# Patient Record
Sex: FEMALE | Race: WHITE | NOT HISPANIC OR LATINO | Employment: OTHER | URBAN - METROPOLITAN AREA
[De-identification: names, ages, dates, MRNs, and addresses within clinical notes are randomized per-mention and may not be internally consistent; named-entity substitution may affect disease eponyms.]

---

## 2017-04-21 RX ORDER — ESTRADIOL 1 MG/1
TABLET ORAL
Qty: 30 TABLET | Refills: 3 | Status: SHIPPED | OUTPATIENT
Start: 2017-04-21 | End: 2017-08-23 | Stop reason: SDUPTHER

## 2017-08-23 RX ORDER — ESTRADIOL 1 MG/1
TABLET ORAL
Qty: 30 TABLET | Refills: 2 | Status: SHIPPED | OUTPATIENT
Start: 2017-08-23 | End: 2017-11-20 | Stop reason: SDUPTHER

## 2017-11-20 RX ORDER — ESTRADIOL 1 MG/1
TABLET ORAL
Qty: 30 TABLET | Refills: 2 | OUTPATIENT
Start: 2017-11-20

## 2017-11-20 RX ORDER — ESTRADIOL 1 MG/1
TABLET ORAL
Qty: 90 TABLET | Refills: 1 | Status: SHIPPED | OUTPATIENT
Start: 2017-11-20 | End: 2018-01-08

## 2018-01-08 ENCOUNTER — OFFICE VISIT (OUTPATIENT)
Dept: OBSTETRICS AND GYNECOLOGY | Facility: CLINIC | Age: 53
End: 2018-01-08

## 2018-01-08 VITALS
WEIGHT: 126 LBS | BODY MASS INDEX: 22.32 KG/M2 | DIASTOLIC BLOOD PRESSURE: 80 MMHG | HEIGHT: 63 IN | SYSTOLIC BLOOD PRESSURE: 128 MMHG

## 2018-01-08 DIAGNOSIS — Z12.4 PAP SMEAR FOR CERVICAL CANCER SCREENING: ICD-10-CM

## 2018-01-08 DIAGNOSIS — Z13.89 SCREENING FOR BLOOD OR PROTEIN IN URINE: ICD-10-CM

## 2018-01-08 DIAGNOSIS — Z12.31 SCREENING MAMMOGRAM, ENCOUNTER FOR: ICD-10-CM

## 2018-01-08 DIAGNOSIS — Z01.419 WELL FEMALE EXAM WITH ROUTINE GYNECOLOGICAL EXAM: Primary | ICD-10-CM

## 2018-01-08 DIAGNOSIS — B37.31 VULVOVAGINAL CANDIDIASIS: ICD-10-CM

## 2018-01-08 DIAGNOSIS — Z93.2 ILEOSTOMY IN PLACE (HCC): ICD-10-CM

## 2018-01-08 DIAGNOSIS — K50.80 CROHN'S DISEASE OF BOTH SMALL AND LARGE INTESTINE WITHOUT COMPLICATION (HCC): ICD-10-CM

## 2018-01-08 LAB
BILIRUB BLD-MCNC: NEGATIVE MG/DL
CLARITY, POC: CLEAR
COLOR UR: YELLOW
GLUCOSE UR STRIP-MCNC: NEGATIVE MG/DL
KETONES UR QL: NEGATIVE
LEUKOCYTE EST, POC: NEGATIVE
NITRITE UR-MCNC: NEGATIVE MG/ML
PH UR: 5.5 [PH] (ref 5–8)
PROT UR STRIP-MCNC: NEGATIVE MG/DL
RBC # UR STRIP: NEGATIVE /UL
SP GR UR: 1.01 (ref 1–1.03)
UROBILINOGEN UR QL: NORMAL

## 2018-01-08 PROCEDURE — 81002 URINALYSIS NONAUTO W/O SCOPE: CPT | Performed by: OBSTETRICS & GYNECOLOGY

## 2018-01-08 PROCEDURE — G0101 CA SCREEN;PELVIC/BREAST EXAM: HCPCS | Performed by: OBSTETRICS & GYNECOLOGY

## 2018-01-08 RX ORDER — ESTRADIOL 1 MG/1
1 TABLET ORAL DAILY
Qty: 90 TABLET | Refills: 3 | Status: SHIPPED | OUTPATIENT
Start: 2018-01-08 | End: 2018-01-08 | Stop reason: SDUPTHER

## 2018-01-08 RX ORDER — ESTRADIOL 1 MG/1
1 TABLET ORAL DAILY
Qty: 90 TABLET | Refills: 3 | Status: SHIPPED | OUTPATIENT
Start: 2018-01-08 | End: 2019-03-09 | Stop reason: SDUPTHER

## 2018-01-08 RX ORDER — ELETRIPTAN HYDROBROMIDE 40 MG/1
40 TABLET, FILM COATED ORAL ONCE AS NEEDED
COMMUNITY
End: 2018-07-17

## 2018-01-08 RX ORDER — EPINEPHRINE 0.3 MG/.3ML
INJECTION SUBCUTANEOUS
COMMUNITY
Start: 2017-12-05 | End: 2022-11-08 | Stop reason: SDUPTHER

## 2018-01-08 RX ORDER — ACYCLOVIR 200 MG/1
200 CAPSULE ORAL 3 TIMES DAILY
COMMUNITY
End: 2018-03-15 | Stop reason: ALTCHOICE

## 2018-01-08 RX ORDER — ONDANSETRON HYDROCHLORIDE 8 MG/1
8 TABLET, FILM COATED ORAL EVERY 8 HOURS PRN
COMMUNITY
Start: 2017-12-01 | End: 2021-06-25

## 2018-01-08 NOTE — PROGRESS NOTES
[facility]  Routine Annual Visit    2018    Patient: Gustavo Maria          MR#:[id number]      History of Present Illness    Chief Complaint   Patient presents with   • Gynecologic Exam     Annual.  Last pap 6/1/15, negative.        52 y.o. female  who presents for annual exam.    No LMP recorded. Patient has had a hysterectomy.  Obstetric History:  OB History      Para Term  AB Living    2 2 2   2    SAB TAB Ectopic Multiple Live Births        2         Menstrual History:     No LMP recorded. Patient has had a hysterectomy.       Sexual History:       ________________________________________  Patient Active Problem List   Diagnosis   • Crohn's disease   • Ileostomy in place   • Vulvovaginal candidiasis   • Well female exam with routine gynecological exam       Past Medical History:   Diagnosis Date   • Anxiety    • Crohn's disease    • Depression    • GERD (gastroesophageal reflux disease)    • H/O ileostomy    • Headache, migraine    • Herpes    • History of transfusion    • Kidney stones        Past Surgical History:   Procedure Laterality Date   •  SECTION     •  SECTION     • CHOLECYSTECTOMY     • COLON SURGERY      TOTAL ABDOMINAL COLECTOMY W/ JPOUCH   • HYSTERECTOMY     • OOPHORECTOMY         History   Smoking Status   • Never Smoker   Smokeless Tobacco   • Never Used       Family History   Problem Relation Age of Onset   • Diabetes Mother    • Hypertension Mother    • Arthritis Mother      RHEUMATOID   • Heart disease Father    • Hypertension Father    • Stroke Father    • Osteoporosis Maternal Grandmother    • Colon cancer Paternal Grandmother      unsure of age   • Breast cancer Neg Hx    • Ovarian cancer Neg Hx    • Uterine cancer Neg Hx    • Deep vein thrombosis Neg Hx    • Pulmonary embolism Neg Hx    • Melanoma Neg Hx    • Prostate cancer Neg Hx        Prior to Admission medications    Medication Sig Start Date End Date  "Taking? Authorizing Provider   acyclovir (ZOVIRAX) 200 MG capsule Take  by mouth.   Yes Historical Provider, MD   Budesonide-Formoterol Fumarate (SYMBICORT IN) Inhale.   Yes Historical Provider, MD   Cetirizine HCl (ZYRTEC ALLERGY PO) Take  by mouth.   Yes Historical Provider, MD   eletriptan (RELPAX) 40 MG tablet Take 40 mg by mouth.   Yes Historical Provider, MD   EPINEPHrine (EPIPEN) 0.3 MG/0.3ML solution auto-injector injection  12/5/17  Yes Historical Provider, MD   estradiol (ESTRACE) 1 MG tablet TAKE ONE TABLET BY MOUTH ONCE DAILY 11/20/17  Yes Burak Morley MD   omeprazole (PriLOSEC) 40 MG capsule  10/3/16  Yes Historical Provider, MD   ondansetron (ZOFRAN) 8 MG tablet  12/1/17  Yes Historical Provider, MD   topiramate (TOPAMAX) 50 MG tablet  10/7/16  Yes Historical Provider, MD     ________________________________________    Current contraception: status post hysterectomy  History of abnormal Pap smear: no  Family history of uterine or ovarian cancer: no  Family History of colon cancer/colon polyps: yes - s/p colectomy   History of abnormal mammogram: no  History of abnormal lipids: no    The following portions of the patient's history were reviewed and updated as appropriate: allergies, current medications, past family history, past medical history, past social history, past surgical history and problem list.    Review of Systems    Pertinent items are noted in HPI.     Objective   Physical Exam    /80  Ht 160 cm (63\")  Wt 57.2 kg (126 lb)  Breastfeeding? No  BMI 22.32 kg/m2   BP Readings from Last 3 Encounters:   01/08/18 128/80   10/24/16 110/64      Wt Readings from Last 3 Encounters:   01/08/18 57.2 kg (126 lb)   10/24/16 52.2 kg (115 lb)        BMI: Estimated body mass index is 22.32 kg/(m^2) as calculated from the following:    Height as of this encounter: 160 cm (63\").    Weight as of this encounter: 57.2 kg (126 lb).    General:   alert, appears stated age and cooperative   Heart: " regular rate and rhythm, S1, S2 normal, no murmur, click, rub or gallop   Lungs: clear to auscultation bilaterally   Abdomen: soft, non-tender, without masses or organomegaly and ileostomy right lower abdomen   Breast: inspection negative, no nipple discharge or bleeding, no masses or nodularity palpable   Vulva: normal   Vagina: normal mucosa   Cervix: absent   Uterus: absent    Adnexa: normal exam but limited by colectomy      As part of wellness and prevention, the following topics were discussed with the patient:     []  Nutrition  []  Physical activity/regular exercise   []  Healthy weight  []  Injury prevention  []  Smoking cessation  []  Substance misuse/abuse  []  Sexual behavior  []  STD prevention  []  Contaception  []  Dental health  []  Mental health  []  Immunization  [x]  Encouraged SBE       Assessment:    Gustavo was seen today for gynecologic exam.    Diagnoses and all orders for this visit:    Well female exam with routine gynecological exam    Pap smear for cervical cancer screening  -     IgP, Aptima HPV - ThinPrep Vial, Cervix    Screening for blood or protein in urine  -     POC Urinalysis Dipstick    Crohn's disease of both small and large intestine without complication    Ileostomy in place    Vulvovaginal candidiasis    Screening mammogram, encounter for  -     Mammo Screening Bilateral With CAD; Future    Other orders  -     estradiol (ESTRACE) 1 MG tablet; Take 1 tablet by mouth Daily.          Plan:  Return in about 1 year (around 1/8/2019) for Annual GYN exam.      Burak Morley MD  1/8/2018 11:15 AM

## 2018-01-11 LAB
CYTOLOGIST CVX/VAG CYTO: NORMAL
CYTOLOGY CVX/VAG DOC THIN PREP: NORMAL
DX ICD CODE: NORMAL
HIV 1 & 2 AB SER-IMP: NORMAL
HPV I/H RISK 4 DNA CVX QL PROBE+SIG AMP: NEGATIVE
Lab: NORMAL
OTHER STN SPEC: NORMAL
PATH REPORT.FINAL DX SPEC: NORMAL
STAT OF ADQ CVX/VAG CYTO-IMP: NORMAL

## 2018-01-12 ENCOUNTER — TELEPHONE (OUTPATIENT)
Dept: OBSTETRICS AND GYNECOLOGY | Facility: CLINIC | Age: 53
End: 2018-01-12

## 2018-01-12 NOTE — TELEPHONE ENCOUNTER
----- Message from Burak Morley MD sent at 1/12/2018  4:38 AM EST -----  Call pt:  PAP is negative.

## 2018-01-31 ENCOUNTER — APPOINTMENT (OUTPATIENT)
Dept: MAMMOGRAPHY | Facility: HOSPITAL | Age: 53
End: 2018-01-31
Attending: OBSTETRICS & GYNECOLOGY

## 2018-02-05 ENCOUNTER — TELEPHONE (OUTPATIENT)
Dept: OBSTETRICS AND GYNECOLOGY | Age: 53
End: 2018-02-05

## 2018-02-06 ENCOUNTER — HOSPITAL ENCOUNTER (OUTPATIENT)
Dept: MAMMOGRAPHY | Facility: HOSPITAL | Age: 53
Discharge: HOME | End: 2018-02-06
Attending: OBSTETRICS & GYNECOLOGY | Admitting: OBSTETRICS & GYNECOLOGY

## 2018-02-06 DIAGNOSIS — Z12.31 SCREENING MAMMOGRAM, ENCOUNTER FOR: ICD-10-CM

## 2018-02-06 PROCEDURE — 77067 SCR MAMMO BI INCL CAD: CPT

## 2018-02-09 ENCOUNTER — TELEPHONE (OUTPATIENT)
Dept: OBSTETRICS AND GYNECOLOGY | Age: 53
End: 2018-02-09

## 2018-02-09 NOTE — TELEPHONE ENCOUNTER
----- Message from Burak Morley MD sent at 2/6/2018  9:36 PM EST -----  Call patient: Normal mammogram

## 2018-03-15 ENCOUNTER — OFFICE VISIT (OUTPATIENT)
Dept: SURGERY | Facility: CLINIC | Age: 53
End: 2018-03-15

## 2018-03-15 VITALS — BODY MASS INDEX: 23.57 KG/M2 | HEART RATE: 87 BPM | HEIGHT: 63 IN | WEIGHT: 133 LBS | OXYGEN SATURATION: 98 %

## 2018-03-15 DIAGNOSIS — K51.019 ULCERATIVE PANCOLITIS WITH COMPLICATION (HCC): ICD-10-CM

## 2018-03-15 DIAGNOSIS — K21.9 GASTROESOPHAGEAL REFLUX DISEASE, ESOPHAGITIS PRESENCE NOT SPECIFIED: Primary | ICD-10-CM

## 2018-03-15 PROCEDURE — 99204 OFFICE O/P NEW MOD 45 MIN: CPT | Performed by: SURGERY

## 2018-03-15 RX ORDER — FLUTICASONE PROPIONATE 50 MCG
2 SPRAY, SUSPENSION (ML) NASAL DAILY
COMMUNITY
End: 2022-07-08

## 2018-03-15 RX ORDER — RIZATRIPTAN BENZOATE 10 MG/1
10 TABLET ORAL ONCE AS NEEDED
COMMUNITY
End: 2018-10-18 | Stop reason: SDUPTHER

## 2018-03-15 NOTE — PROGRESS NOTES
SUMMARY (A/P):    52-year-old lady with worsening heartburn and reflux (unknown etiology) that seems somewhat refractory to treatment with Prilosec over the last 4-5 months.  Given the worsening nature of her chronic reflux and her history of ulcerative colitis described below I've recommended starting with upper GI and small bowel follow-through to evaluate her esophagus and stomach as well as the remainder of the small bowel for any evidence of inflammatory bowel disease.  We will likely then proceed with upper endoscopy in less findings from the small bowel follow-through suggest a different approach.      CC:  Referred for consultation by Dr. Villeda regarding reflux symptoms    HPI:  52-year-old lady with 4-5 month history of moderately severe gastroesophageal reflux symptoms including nighttime symptoms but not aspiration.  Associated with mild chest pain and excess belching.  No significant abdominal pain.  She's been on Prilosec for at least 10 years which was controlling her symptoms reasonably well until 5 months ago.    PHYSICAL EXAM:   Constitutional: Well-developed well-nourished, no acute distress  Vital signs: Heart rate 87, weight 133 pounds, height 63 inches, BMI 23.6  Eyes: Conjunctiva normal, sclera nonicteric  ENMT: Hearing grossly normal, oral mucosa moist  Neck: Supple, no palpable mass, normal thyroid, trachea midline  Respiratory: Clear to auscultation, normal inspiratory effort  Cardiovascular: Regular rate, no murmur, no carotid bruit, no peripheral edema, no jugular venous distention  Gastrointestinal: Soft, nontender, no palpable mass, no hepatosplenomegaly, negative for hernia, bowel sounds normal  Lymphatics (palpable nodes):  cervical-negative, axillary-negative  Skin:  Warm, dry, no rash on visualized skin surfaces  Musculoskeletal: Symmetric strength, normal gait  Psychiatric: Alert and oriented ×3, normal affect     ALLERGIES: reviewed, in Epic    MEDICATIONS: reviewed, in Epic    PMH:     Ulcerative colitis  Gastroesophageal reflux disease  Nephrolithiasis    PSH:    Hysterectomy  Oophorectomy    Laparoscopic cholecystectomy  Total proctocolectomy with J-pouch and diverting ileostomy   Ileostomy closure   Completion proctectomy with permanent ileostomy   **All of her colon surgery was performed in [place].  In discussion with her it is apparent that J-pouch function/complications ultimately led to the completion proctectomy and permanent ileostomy and some discussion was had as to whether the diagnosis might of been Crohn's disease although there is been no conclusive proof of that heretofore.    FAMILY HISTORY:    Paternal grandmother with colon cancer  Mother with colon polyps    SOCIAL HISTORY:   Denies tobacco use  Occasional alcohol use    ROS:  No chest pain or shortness of air.  All other systems reviewed and negative other than presenting complaints.    MARY KATE WALDEN M.D.

## 2018-03-29 ENCOUNTER — HOSPITAL ENCOUNTER (OUTPATIENT)
Dept: GENERAL RADIOLOGY | Facility: HOSPITAL | Age: 53
Discharge: HOME | End: 2018-03-29
Attending: SURGERY | Admitting: SURGERY

## 2018-03-29 ENCOUNTER — DOCUMENTATION (OUTPATIENT)
Dept: SURGERY | Facility: CLINIC | Age: 53
End: 2018-03-29

## 2018-03-29 PROCEDURE — 63710000001 BARIUM SULFATE 96 % RECONSTITUTED SUSPENSION: Performed by: SURGERY

## 2018-03-29 PROCEDURE — A9270 NON-COVERED ITEM OR SERVICE: HCPCS | Performed by: SURGERY

## 2018-03-29 PROCEDURE — 74249: CPT

## 2018-03-29 PROCEDURE — 63710000001 BARIUM SULFATE 98 % RECONSTITUTED SUSPENSION: Performed by: SURGERY

## 2018-03-29 PROCEDURE — 63710000001 SOD BICARB-CITRIC ACID-SIMETHICONE 2.21-1.53-0.04 G PACK: Performed by: SURGERY

## 2018-03-29 RX ADMIN — ANTACID/ANTIFLATULENT 1 TABLET: 380; 550; 10; 10 GRANULE, EFFERVESCENT ORAL at 09:00

## 2018-03-29 RX ADMIN — BARIUM SULFATE 183 ML: 960 POWDER, FOR SUSPENSION ORAL at 09:10

## 2018-03-29 RX ADMIN — BARIUM SULFATE 135 ML: 980 POWDER, FOR SUSPENSION ORAL at 09:15

## 2018-03-29 NOTE — PROGRESS NOTES
Spoke with her regarding her upper GI and small bowel follow-through results which basically were normal with the exception of a small sliding hiatal hernia and no significant reflux during the study.  Interestingly she is felt much better and last couple weeks and her symptoms are much better controlled.  Therefore, no further workup is warranted at this time.

## 2018-07-17 ENCOUNTER — LAB (OUTPATIENT)
Dept: LAB | Facility: HOSPITAL | Age: 53
End: 2018-07-17

## 2018-07-17 ENCOUNTER — OFFICE VISIT (OUTPATIENT)
Dept: NEUROLOGY | Facility: CLINIC | Age: 53
End: 2018-07-17

## 2018-07-17 VITALS
SYSTOLIC BLOOD PRESSURE: 123 MMHG | HEART RATE: 77 BPM | DIASTOLIC BLOOD PRESSURE: 80 MMHG | HEIGHT: 63 IN | BODY MASS INDEX: 23.21 KG/M2 | WEIGHT: 131 LBS | OXYGEN SATURATION: 99 %

## 2018-07-17 DIAGNOSIS — H53.2 DIPLOPIA: ICD-10-CM

## 2018-07-17 DIAGNOSIS — G43.119 INTRACTABLE MIGRAINE WITH AURA WITHOUT STATUS MIGRAINOSUS: ICD-10-CM

## 2018-07-17 DIAGNOSIS — G43.119 INTRACTABLE MIGRAINE WITH AURA WITHOUT STATUS MIGRAINOSUS: Primary | ICD-10-CM

## 2018-07-17 LAB
ALBUMIN SERPL-MCNC: 4.3 G/DL (ref 3.5–5.2)
ALBUMIN/GLOB SERPL: 1.4 G/DL
ALP SERPL-CCNC: 49 U/L (ref 39–117)
ALT SERPL W P-5'-P-CCNC: 13 U/L (ref 1–33)
ANION GAP SERPL CALCULATED.3IONS-SCNC: 11.3 MMOL/L
AST SERPL-CCNC: 19 U/L (ref 1–32)
BASOPHILS # BLD AUTO: 0.06 10*3/MM3 (ref 0–0.2)
BASOPHILS NFR BLD AUTO: 1 % (ref 0–1.5)
BILIRUB SERPL-MCNC: 0.3 MG/DL (ref 0.1–1.2)
BUN BLD-MCNC: 13 MG/DL (ref 6–20)
BUN/CREAT SERPL: 12.3 (ref 7–25)
CALCIUM SPEC-SCNC: 9.2 MG/DL (ref 8.6–10.5)
CHLORIDE SERPL-SCNC: 109 MMOL/L (ref 98–107)
CO2 SERPL-SCNC: 21.7 MMOL/L (ref 22–29)
CREAT BLD-MCNC: 1.06 MG/DL (ref 0.57–1)
DEPRECATED RDW RBC AUTO: 44.1 FL (ref 37–54)
EOSINOPHIL # BLD AUTO: 0.1 10*3/MM3 (ref 0–0.7)
EOSINOPHIL NFR BLD AUTO: 1.7 % (ref 0.3–6.2)
ERYTHROCYTE [DISTWIDTH] IN BLOOD BY AUTOMATED COUNT: 12.6 % (ref 11.7–13)
ERYTHROCYTE [SEDIMENTATION RATE] IN BLOOD: 8 MM/HR (ref 0–30)
GFR SERPL CREATININE-BSD FRML MDRD: 55 ML/MIN/1.73
GLOBULIN UR ELPH-MCNC: 3.1 GM/DL
GLUCOSE BLD-MCNC: 91 MG/DL (ref 65–99)
HCT VFR BLD AUTO: 41.8 % (ref 35.6–45.5)
HGB BLD-MCNC: 14.2 G/DL (ref 11.9–15.5)
IMM GRANULOCYTES # BLD: 0.02 10*3/MM3 (ref 0–0.03)
IMM GRANULOCYTES NFR BLD: 0.3 % (ref 0–0.5)
LYMPHOCYTES # BLD AUTO: 1.61 10*3/MM3 (ref 0.9–4.8)
LYMPHOCYTES NFR BLD AUTO: 27.7 % (ref 19.6–45.3)
MCH RBC QN AUTO: 32.3 PG (ref 26.9–32)
MCHC RBC AUTO-ENTMCNC: 34 G/DL (ref 32.4–36.3)
MCV RBC AUTO: 95 FL (ref 80.5–98.2)
MONOCYTES # BLD AUTO: 0.38 10*3/MM3 (ref 0.2–1.2)
MONOCYTES NFR BLD AUTO: 6.5 % (ref 5–12)
NEUTROPHILS # BLD AUTO: 3.65 10*3/MM3 (ref 1.9–8.1)
NEUTROPHILS NFR BLD AUTO: 62.8 % (ref 42.7–76)
NRBC BLD MANUAL-RTO: 0 /100 WBC (ref 0–0)
PLATELET # BLD AUTO: 235 10*3/MM3 (ref 140–500)
PMV BLD AUTO: 9.5 FL (ref 6–12)
POTASSIUM BLD-SCNC: 3.9 MMOL/L (ref 3.5–5.2)
PROT SERPL-MCNC: 7.4 G/DL (ref 6–8.5)
RBC # BLD AUTO: 4.4 10*6/MM3 (ref 3.9–5.2)
SODIUM BLD-SCNC: 142 MMOL/L (ref 136–145)
T4 FREE SERPL-MCNC: 1.13 NG/DL (ref 0.93–1.7)
TSH SERPL DL<=0.05 MIU/L-ACNC: 1.97 MIU/ML (ref 0.27–4.2)
WBC NRBC COR # BLD: 5.82 10*3/MM3 (ref 4.5–10.7)

## 2018-07-17 PROCEDURE — 36415 COLL VENOUS BLD VENIPUNCTURE: CPT

## 2018-07-17 PROCEDURE — 84443 ASSAY THYROID STIM HORMONE: CPT

## 2018-07-17 PROCEDURE — 80053 COMPREHEN METABOLIC PANEL: CPT

## 2018-07-17 PROCEDURE — 99203 OFFICE O/P NEW LOW 30 MIN: CPT | Performed by: NURSE PRACTITIONER

## 2018-07-17 PROCEDURE — 85651 RBC SED RATE NONAUTOMATED: CPT

## 2018-07-17 PROCEDURE — 84439 ASSAY OF FREE THYROXINE: CPT | Performed by: NURSE PRACTITIONER

## 2018-07-17 PROCEDURE — 85025 COMPLETE CBC W/AUTO DIFF WBC: CPT

## 2018-07-17 NOTE — PROGRESS NOTES
"Subjective:     Patient ID: Gustavo Maria is a 52 y.o. female presenting for evaluation for migraine headaches. The patient has had migraine headaches for the past 10-12 years but they have worsened in frequency and severity of the past couple of years. She now has almost a daily headache. The pain is located in the right frontal area. She is not able to describe the pain but says it is just very painful. She has photophobia, phonophobia, nausea, and vomiting with the headaches. She notices she becomes \"irritable\" a few days prior to the onset of the migraine. She has \"gray splotches\" in her vision just prior to onset of the headache. She is currently taking topiramate 50 mg twice a day. This seemed to help quite a bit when it was first started 2 years ago, but over time has become less effective. She uses Maxalt as needed. This helps to make the pain \"tolerable\". She has also tried Imitrex and Relpax without benefit. She has not tried any other daily preventative medications other than topiramate.     She denies smoking. She drinks 1-2 cups of coffee per day. She says she drinks a \"ton\" of water. She denies any recent medication changes. She has a family history of migraine headaches in her father.     She has quite a bit of trouble sleeping. It takes her about 3 hours to fall asleep after she lays down and she wakes up about 3 times per night, getting only about 4 hours of total sleep per night.     History of Present Illness  The following portions of the patient's history were reviewed and updated as appropriate: allergies, current medications, past family history, past medical history, past social history, past surgical history and problem list.    Review of Systems   Constitutional: Negative for activity change, appetite change and fatigue.   HENT: Negative for facial swelling, hearing loss and trouble swallowing.    Eyes: Positive for photophobia (with migraine ) and visual disturbance (with migraine). " Negative for pain.   Respiratory: Negative for choking, chest tightness and shortness of breath.    Cardiovascular: Negative for chest pain, palpitations and leg swelling.   Gastrointestinal: Positive for nausea (with miigraine) and vomiting (with migraine ). Negative for abdominal pain.   Endocrine: Positive for polydipsia. Negative for polyphagia.   Musculoskeletal: Negative for back pain, gait problem and neck pain.   Neurological: Positive for dizziness, numbness (in fingers ) and headaches. Negative for tremors, seizures, syncope, facial asymmetry, speech difficulty, weakness and light-headedness.   Hematological: Negative for adenopathy. Does not bruise/bleed easily.   Psychiatric/Behavioral: Positive for confusion and decreased concentration. Negative for agitation, behavioral problems, dysphoric mood, hallucinations, self-injury, sleep disturbance and suicidal ideas. The patient is not nervous/anxious and is not hyperactive.         Objective:    Neurologic Exam  General: Well nourished, well developed, and in no acute distress.  HEENT: Normocephalic/atraumatic. Mucous membranes moist. Sclerae anicteric.   Heart: Regular rate and rhythm. No murmurs, rubs or gallops.  Lungs: Clear to auscultation bilaterally.  Skin: No notable rashes or lesions on the visible surfaces.   Extremities: No clubbing, cyanosis or significant edema.   Psychiatric: Pleasant, cooperative, and appropriate.   Neurologic Exam:  Mental Status:  Alert and oriented. Speech is fluent. Comprehension is intact.   Cranial Nerves II-XII: Pupils equal, round, reactive to light. Extraocular movements are full and conjugate in all directions. Pursuit movements do not provoke any apparent dizziness or discomfort.  No nystagmus noted.  Hearing is intact to voice. Facial strength and sensation are preserved and symmetric. Tongue and palate midline. Voice non-hoarse, non-dysarthric.   Motor: Normal bulk and tone of bilateral upper and lower  extremities. Strength is 5/5 in all 4 extremities both proximally and distally. There are no abnormal or involuntary movements noted.  Sensation: Intact to light touch throughout. Romberg was negative with no significant sway.   Coordination: Fully intact. Finger-to-nose performed accurately bilaterally.  Reflexes: The deep tendon reflexes are 2+/4 in bilateral biceps, brachioradialis, triceps, patella, and Achilles.  No pathologic reflexes were noted.  Gait: Normal. No ataxia or apraxia.         Physical Exam    Assessment/Plan:     Gustavo was seen today for migraine.    Diagnoses and all orders for this visit:    Intractable migraine with aura without status migrainosus  -     TSH; Future  -     T4, free; Future  -     CBC & Differential; Future  -     Comprehensive Metabolic Panel; Future  -     Sedimentation Rate; Future    Diplopia   -     TSH; Future  -     T4, free; Future    Other orders  -     Topiramate ER (TROKENDI XR) 200 MG capsule sustained-release 24 hr; Take 1 capsule by mouth Daily.         1. Migraine headaches, worsening. Will switch the immediate release topiramate to Trokendi and titrate her to 200 mg nightly. Side effects reviewed. If this does not improve her headaches she is going to call and I will switch her to nortriptyline. The nortriptyline may be a good option for her to help improve sleep as well. Continue Maxalt as needed for now. Will discuss other options for abortive medications at her next visit. Will check CBC, CMP, TSH, free T4, and sed rate today and call with results.     Follow up 3 months.

## 2018-07-25 ENCOUNTER — TELEPHONE (OUTPATIENT)
Dept: NEUROLOGY | Facility: CLINIC | Age: 53
End: 2018-07-25

## 2018-07-25 NOTE — TELEPHONE ENCOUNTER
Pt called and said Insurance will not cover Trokendi XR but will cover topiramate immediate release. If you are okay with this she will need a new Rx sent to the pharmacy.

## 2018-07-26 RX ORDER — TOPIRAMATE 100 MG/1
100 TABLET, FILM COATED ORAL 2 TIMES DAILY
Qty: 180 TABLET | Refills: 0 | Status: SHIPPED | OUTPATIENT
Start: 2018-07-26 | End: 2018-08-07 | Stop reason: SDUPTHER

## 2018-07-26 NOTE — TELEPHONE ENCOUNTER
Pt is concerned about the side effects ie...confussion unable to concentrate ect....She says she is already feeling better on the new medication. Should we try to do a PA ?

## 2018-07-27 NOTE — TELEPHONE ENCOUNTER
I am submitted a PA to her [employer] prescription coverage however it is a medicare plan. So we will see if it will get approved.

## 2018-08-03 ENCOUNTER — TELEPHONE (OUTPATIENT)
Dept: NEUROLOGY | Facility: CLINIC | Age: 53
End: 2018-08-03

## 2018-08-03 NOTE — TELEPHONE ENCOUNTER
----- Message from Ally Weinberg sent at 7/31/2018  9:00 AM EDT -----  Regarding: Gustavo Maria's Topamax  Contact: [phone]  Pt saw Chao Sargent 2 wks ago and was given samples of Topamax XR 200Mg and was told we would call in her mail-in order rx.  Insurance denied the rx and wants pt to take Topamax 100mg x 2 daily.  Pt wants to know if rx was called to her mail- in and she also will need a rx called to a pharmacy to hold her until her mail- in arrives - [facility] [phone].    Pt also sts that it was also discussed she try a completely different med. She is open to that.       Thank you

## 2018-08-07 RX ORDER — TOPIRAMATE 100 MG/1
100 TABLET, FILM COATED ORAL 2 TIMES DAILY
Qty: 60 TABLET | Refills: 1 | Status: SHIPPED | OUTPATIENT
Start: 2018-08-07 | End: 2018-10-18

## 2018-08-07 NOTE — TELEPHONE ENCOUNTER
I just sent 2 months supply to [facility], but I sent a 90 day supply to the mail order pharmacy two weeks ago so she may need to call and check on that? It says it was sent on 7/26

## 2018-10-18 ENCOUNTER — OFFICE VISIT (OUTPATIENT)
Dept: NEUROLOGY | Facility: CLINIC | Age: 53
End: 2018-10-18

## 2018-10-18 VITALS
SYSTOLIC BLOOD PRESSURE: 152 MMHG | DIASTOLIC BLOOD PRESSURE: 88 MMHG | BODY MASS INDEX: 23.92 KG/M2 | WEIGHT: 135 LBS | HEIGHT: 63 IN

## 2018-10-18 DIAGNOSIS — G43.119 INTRACTABLE MIGRAINE WITH AURA WITHOUT STATUS MIGRAINOSUS: Primary | ICD-10-CM

## 2018-10-18 PROCEDURE — 99213 OFFICE O/P EST LOW 20 MIN: CPT | Performed by: NURSE PRACTITIONER

## 2018-10-18 RX ORDER — RIZATRIPTAN BENZOATE 10 MG/1
10 TABLET ORAL ONCE AS NEEDED
Qty: 18 TABLET | Refills: 5 | Status: SHIPPED | OUTPATIENT
Start: 2018-10-18

## 2018-10-18 RX ORDER — KETOROLAC TROMETHAMINE 15.75 MG/1
SPRAY, METERED NASAL
Qty: 5 EACH | Refills: 3 | Status: SHIPPED | OUTPATIENT
Start: 2018-10-18 | End: 2021-06-25

## 2018-10-18 RX ORDER — ELETRIPTAN HYDROBROMIDE 40 MG/1
TABLET, FILM COATED ORAL
Qty: 9 TABLET | Refills: 5 | Status: SHIPPED | OUTPATIENT
Start: 2018-10-18

## 2018-10-18 RX ORDER — NORTRIPTYLINE HYDROCHLORIDE 25 MG/1
50 CAPSULE ORAL NIGHTLY
Qty: 180 CAPSULE | Refills: 1 | Status: SHIPPED | OUTPATIENT
Start: 2018-10-18 | End: 2019-08-23

## 2018-10-18 NOTE — PROGRESS NOTES
"Subjective:     Patient ID: Gustavo Maria is a 52 y.o. female presenting for follow up for migraine headaches. My initial visit with the patient was on July 17, 2018. She had been on topiramate for about 2 years at the time but was having side effects and her headaches were worsening. I switched her to Trokendi but it was not covered by her insurance so she is no longer taking anything for her headaches other than Maxalt as needed. She has tried Relpax and Imitrex as well. Imitrex did not work well for her. The Relpax worked well but was not covered by her insurance.     She has been having about 2 migraine headaches per week. They are right frontal accompanied by photophobia, phonophobia, nausea, and vomiting. She has \"gray splotches\" in her vision just prior to onset of the headache.    She has quite a bit of trouble sleeping. It takes her about 3 hours to fall asleep after she lays down and she wakes up about 3 times per night, getting only about 4 hours of total sleep per night.     Migraine    Associated symptoms include dizziness and weakness. Pertinent negatives include no back pain, neck pain, numbness or seizures.     The following portions of the patient's history were reviewed and updated as appropriate: allergies, current medications, past family history, past medical history, past social history, past surgical history and problem list.    Review of Systems   Musculoskeletal: Negative for back pain, gait problem and neck pain.   Neurological: Positive for dizziness, weakness and headaches. Negative for tremors, seizures, syncope, facial asymmetry, speech difficulty, light-headedness and numbness.   Hematological: Negative for adenopathy. Bruises/bleeds easily.   Psychiatric/Behavioral: Positive for sleep disturbance. Negative for agitation, behavioral problems, confusion, decreased concentration, dysphoric mood, hallucinations, self-injury and suicidal ideas. The patient is not nervous/anxious and is " not hyperactive.         Objective:    Neurologic Exam  General: Well nourished, well developed, and in no acute distress.  HEENT: Normocephalic/atraumatic. Mucous membranes moist. Sclerae anicteric.   Heart: Regular rate and rhythm. No murmurs, rubs or gallops.  Lungs: Clear to auscultation bilaterally.  Skin: No notable rashes or lesions on the visible surfaces.   Extremities: No clubbing, cyanosis or significant edema.   Psychiatric: Pleasant, cooperative, and appropriate.   Neurologic Exam:  Mental Status:  Alert and oriented. Speech is fluent. Comprehension is intact.   Cranial Nerves II-XII: Pupils equal, round, reactive to light. Extraocular movements are full and conjugate in all directions. Pursuit movements do not provoke any apparent dizziness or discomfort.  No nystagmus noted.  Hearing is intact to voice. Facial strength and sensation are preserved and symmetric. Tongue and palate midline. Voice non-hoarse, non-dysarthric.   Motor: Normal bulk and tone of bilateral upper and lower extremities. Strength is 5/5 in all 4 extremities both proximally and distally. There are no abnormal or involuntary movements noted.  Sensation: Intact to light touch throughout. Romberg was negative with no significant sway.   Coordination: Fully intact. Finger-to-nose performed accurately bilaterally.  Reflexes: The deep tendon reflexes are 2+/4 in bilateral biceps, brachioradialis, triceps, patella, and Achilles.  No pathologic reflexes were noted.  Gait: Normal. No ataxia or apraxia.      Physical Exam    Assessment/Plan:     Gustavo was seen today for migraine.    Diagnoses and all orders for this visit:    Intractable migraine with aura without status migrainosus    Other orders  -     nortriptyline (PAMELOR) 25 MG capsule; Take 2 capsules by mouth Every Night.  -     rizatriptan (MAXALT) 10 MG tablet; Take 1 tablet by mouth 1 (One) Time As Needed for Migraine. May repeat in 2 hours if needed  -     eletriptan (RELPAX)  40 MG tablet; Take one tablet by mouth at onset of migraine. May repeat in 2 hours if needed. Do not exceed 2 tablets in 24 hours.  -     Ketorolac Tromethamine (SPRIX) 15.75 MG/SPRAY solution; Use one spray per nostril once daily as needed for headache.       1. Migraine headaches: Will start nortriptyline 25 mg nightly. She may increase to 50 mg nightly after a few weeks if needed. Side effects reviewed. If she does not tolerate this well or if it is not effective for her, we discussed other options including Aimovig and zonisamide. Continue Maxalt as needed. I did send a prescription for Relpax since this has worked better than Maxalt for her in the past. This may be covered by her insurance [facility] eit is generic now. I also sent a prescription for Sprix nasal spray. She can use this in combination with the triptan if needed for a more severe headache. Side effects reviewed.     She will call with any problems, otherwise follow up in 3 months.

## 2019-02-14 ENCOUNTER — APPOINTMENT (OUTPATIENT)
Dept: CT IMAGING | Facility: HOSPITAL | Age: 54
End: 2019-02-14

## 2019-02-14 ENCOUNTER — HOSPITAL ENCOUNTER (EMERGENCY)
Facility: HOSPITAL | Age: 54
Discharge: ANOTHER HOSPITAL | End: 2019-02-15
Attending: EMERGENCY MEDICINE | Admitting: EMERGENCY MEDICINE

## 2019-02-14 DIAGNOSIS — A41.9 SEPSIS, DUE TO UNSPECIFIED ORGANISM: ICD-10-CM

## 2019-02-14 DIAGNOSIS — K56.609 SMALL BOWEL OBSTRUCTION (HCC): ICD-10-CM

## 2019-02-14 DIAGNOSIS — R10.84 GENERALIZED ABDOMINAL PAIN: Primary | ICD-10-CM

## 2019-02-14 LAB
ALBUMIN SERPL-MCNC: 4.6 G/DL (ref 3.5–5.2)
ALBUMIN/GLOB SERPL: 1.2 G/DL
ALP SERPL-CCNC: 59 U/L (ref 39–117)
ALT SERPL W P-5'-P-CCNC: 18 U/L (ref 1–33)
ANION GAP SERPL CALCULATED.3IONS-SCNC: 23.2 MMOL/L
AST SERPL-CCNC: 18 U/L (ref 1–32)
BASOPHILS # BLD AUTO: 0.06 10*3/MM3 (ref 0–0.2)
BASOPHILS NFR BLD AUTO: 0.3 % (ref 0–1.5)
BILIRUB SERPL-MCNC: 0.5 MG/DL (ref 0.1–1.2)
BUN BLD-MCNC: 8 MG/DL (ref 6–20)
BUN/CREAT SERPL: 2.7 (ref 7–25)
CALCIUM SPEC-SCNC: 10.4 MG/DL (ref 8.6–10.5)
CHLORIDE SERPL-SCNC: 96 MMOL/L (ref 98–107)
CO2 SERPL-SCNC: 20.8 MMOL/L (ref 22–29)
CREAT BLD-MCNC: 2.99 MG/DL (ref 0.57–1)
D-LACTATE SERPL-SCNC: 0.9 MMOL/L (ref 0.5–2)
D-LACTATE SERPL-SCNC: 2.8 MMOL/L (ref 0.5–2)
DEPRECATED RDW RBC AUTO: 43.5 FL (ref 37–54)
EOSINOPHIL # BLD AUTO: 0.09 10*3/MM3 (ref 0–0.4)
EOSINOPHIL NFR BLD AUTO: 0.5 % (ref 0.3–6.2)
ERYTHROCYTE [DISTWIDTH] IN BLOOD BY AUTOMATED COUNT: 12.5 % (ref 12.3–15.4)
GFR SERPL CREATININE-BSD FRML MDRD: 16 ML/MIN/1.73
GLOBULIN UR ELPH-MCNC: 3.9 GM/DL
GLUCOSE BLD-MCNC: 91 MG/DL (ref 65–99)
HCT VFR BLD AUTO: 38.1 % (ref 34–46.6)
HGB BLD-MCNC: 12.4 G/DL (ref 12–15.9)
HOLD SPECIMEN: NORMAL
IMM GRANULOCYTES # BLD AUTO: 0.29 10*3/MM3 (ref 0–0.05)
IMM GRANULOCYTES NFR BLD AUTO: 1.5 % (ref 0–0.5)
INR PPP: 0.97 (ref 0.9–1.1)
LIPASE SERPL-CCNC: 193 U/L (ref 13–60)
LYMPHOCYTES # BLD AUTO: 1.68 10*3/MM3 (ref 0.7–3.1)
LYMPHOCYTES NFR BLD AUTO: 8.5 % (ref 19.6–45.3)
MCH RBC QN AUTO: 31 PG (ref 26.6–33)
MCHC RBC AUTO-ENTMCNC: 32.5 G/DL (ref 31.5–35.7)
MCV RBC AUTO: 95.3 FL (ref 79–97)
MONOCYTES # BLD AUTO: 1.05 10*3/MM3 (ref 0.1–0.9)
MONOCYTES NFR BLD AUTO: 5.3 % (ref 5–12)
NEUTROPHILS # BLD AUTO: 16.65 10*3/MM3 (ref 1.4–7)
NEUTROPHILS NFR BLD AUTO: 83.9 % (ref 42.7–76)
NRBC BLD AUTO-RTO: 0 /100 WBC (ref 0–0)
PLATELET # BLD AUTO: 562 10*3/MM3 (ref 140–450)
PMV BLD AUTO: 9.8 FL (ref 6–12)
POTASSIUM BLD-SCNC: 3.8 MMOL/L (ref 3.5–5.2)
PROT SERPL-MCNC: 8.5 G/DL (ref 6–8.5)
PROTHROMBIN TIME: 12.7 SECONDS (ref 11.7–14.2)
RBC # BLD AUTO: 4 10*6/MM3 (ref 3.77–5.28)
SODIUM BLD-SCNC: 140 MMOL/L (ref 136–145)
WBC NRBC COR # BLD: 19.82 10*3/MM3 (ref 3.4–10.8)

## 2019-02-14 PROCEDURE — 85025 COMPLETE CBC W/AUTO DIFF WBC: CPT | Performed by: NURSE PRACTITIONER

## 2019-02-14 PROCEDURE — 96376 TX/PRO/DX INJ SAME DRUG ADON: CPT

## 2019-02-14 PROCEDURE — 36415 COLL VENOUS BLD VENIPUNCTURE: CPT | Performed by: NURSE PRACTITIONER

## 2019-02-14 PROCEDURE — 80053 COMPREHEN METABOLIC PANEL: CPT | Performed by: NURSE PRACTITIONER

## 2019-02-14 PROCEDURE — 25010000002 HYDROMORPHONE PER 4 MG: Performed by: EMERGENCY MEDICINE

## 2019-02-14 PROCEDURE — 96375 TX/PRO/DX INJ NEW DRUG ADDON: CPT

## 2019-02-14 PROCEDURE — 96365 THER/PROPH/DIAG IV INF INIT: CPT

## 2019-02-14 PROCEDURE — 99285 EMERGENCY DEPT VISIT HI MDM: CPT

## 2019-02-14 PROCEDURE — 83690 ASSAY OF LIPASE: CPT | Performed by: NURSE PRACTITIONER

## 2019-02-14 PROCEDURE — 96367 TX/PROPH/DG ADDL SEQ IV INF: CPT

## 2019-02-14 PROCEDURE — 87040 BLOOD CULTURE FOR BACTERIA: CPT | Performed by: NURSE PRACTITIONER

## 2019-02-14 PROCEDURE — 96361 HYDRATE IV INFUSION ADD-ON: CPT

## 2019-02-14 PROCEDURE — 74176 CT ABD & PELVIS W/O CONTRAST: CPT

## 2019-02-14 PROCEDURE — 25010000002 VANCOMYCIN 10 G RECONSTITUTED SOLUTION: Performed by: NURSE PRACTITIONER

## 2019-02-14 PROCEDURE — 25010000002 PIPERACILLIN SOD-TAZOBACTAM PER 1 G: Performed by: NURSE PRACTITIONER

## 2019-02-14 PROCEDURE — 83605 ASSAY OF LACTIC ACID: CPT | Performed by: NURSE PRACTITIONER

## 2019-02-14 PROCEDURE — 25010000002 ONDANSETRON PER 1 MG: Performed by: NURSE PRACTITIONER

## 2019-02-14 PROCEDURE — 25010000002 ONDANSETRON PER 1 MG: Performed by: EMERGENCY MEDICINE

## 2019-02-14 PROCEDURE — 85610 PROTHROMBIN TIME: CPT | Performed by: NURSE PRACTITIONER

## 2019-02-14 RX ORDER — ONDANSETRON 2 MG/ML
4 INJECTION INTRAMUSCULAR; INTRAVENOUS ONCE
Status: COMPLETED | OUTPATIENT
Start: 2019-02-14 | End: 2019-02-14

## 2019-02-14 RX ORDER — ONDANSETRON 4 MG/1
4 TABLET, ORALLY DISINTEGRATING ORAL ONCE
Status: COMPLETED | OUTPATIENT
Start: 2019-02-14 | End: 2019-02-14

## 2019-02-14 RX ORDER — HYDROMORPHONE HYDROCHLORIDE 1 MG/ML
0.5 INJECTION, SOLUTION INTRAMUSCULAR; INTRAVENOUS; SUBCUTANEOUS ONCE
Status: COMPLETED | OUTPATIENT
Start: 2019-02-14 | End: 2019-02-14

## 2019-02-14 RX ADMIN — SODIUM CHLORIDE 1000 ML: 9 INJECTION, SOLUTION INTRAVENOUS at 20:29

## 2019-02-14 RX ADMIN — TAZOBACTAM SODIUM AND PIPERACILLIN SODIUM 3.38 G: 375; 3 INJECTION, SOLUTION INTRAVENOUS at 18:34

## 2019-02-14 RX ADMIN — HYDROMORPHONE HYDROCHLORIDE 0.5 MG: 1 INJECTION, SOLUTION INTRAMUSCULAR; INTRAVENOUS; SUBCUTANEOUS at 20:51

## 2019-02-14 RX ADMIN — VANCOMYCIN HYDROCHLORIDE 1250 MG: 10 INJECTION, POWDER, LYOPHILIZED, FOR SOLUTION INTRAVENOUS at 20:32

## 2019-02-14 RX ADMIN — SODIUM CHLORIDE 1000 ML: 9 INJECTION, SOLUTION INTRAVENOUS at 16:47

## 2019-02-14 RX ADMIN — ONDANSETRON 4 MG: 4 TABLET, ORALLY DISINTEGRATING ORAL at 23:47

## 2019-02-14 RX ADMIN — ONDANSETRON HYDROCHLORIDE 4 MG: 2 SOLUTION INTRAMUSCULAR; INTRAVENOUS at 18:06

## 2019-02-14 RX ADMIN — ONDANSETRON HYDROCHLORIDE 4 MG: 2 SOLUTION INTRAMUSCULAR; INTRAVENOUS at 20:49

## 2019-02-14 NOTE — PROGRESS NOTES
Per request from Reyna SCOTT, spoke w/ Lisa at [facility] and she will have MD return call to Reyna re transfer to [place] for continuity of care. Reyna romero

## 2019-02-14 NOTE — ED PROVIDER NOTES
EMERGENCY DEPARTMENT ENCOUNTER    CHIEF COMPLAINT  Chief Complaint: abdominal pain  History given by:patient  History limited by: none  Time Seen: 1634  Room Number: 44/44  PMD: Ebony Canas, APRN  Dr. Woodard, GI    HPI:  Pt is a 53 y.o. female who presents with abdominal pain w/ n/v/d that began last night. She had been able to tolerate PO fluids and broth, but today started to vomit it up. She has associated chills, but denies CP, SOA, fever, chills, HA, and other complaints. Pt was d/c last night from [facility] after being admitted for 2 weeks for a mass/tumor that was removed. It was found to be non-cancerous. Pt has fentanyl patch on that was placed yesterday.     Duration: one day  Timing:constant  Location: abdomen  Radiation: none   Quality:'pain'  Intensity/Severity:moderate  Progression: unchanged  Associated Symptoms: n/v/d, chills  Aggravating Factors: none  Alleviating Factors: none  Previous Episodes: pt recently d/c after mass removed from intestines  Treatment before arrival: none    PAST MEDICAL HISTORY  Active Ambulatory Problems     Diagnosis Date Noted   • Crohn's disease (CMS/HCC)    • Ileostomy in place (CMS/HCC) 10/24/2016   • Vulvovaginal candidiasis 10/24/2016   • Well female exam with routine gynecological exam 2018   • Intractable migraine with aura without status migrainosus 10/18/2018     Resolved Ambulatory Problems     Diagnosis Date Noted   • No Resolved Ambulatory Problems     Past Medical History:   Diagnosis Date   • Anxiety    • Asthma    • Crohn's disease (CMS/HCC)    • Depression    • GERD (gastroesophageal reflux disease)    • H/O ileostomy    • Headache, migraine    • Herpes    • History of MRSA infection    • History of transfusion    • Kidney stones    • Ulcerative colitis (CMS/HCC)        PAST SURGICAL HISTORY  Past Surgical History:   Procedure Laterality Date   • ABDOMINOPERINEAL PROCTOCOLECTOMY N/A 2002    with permanent ileostomy   •  SECTION  ,  1993   • COLECTOMY TOTAL N/A 2001    Total abdominal colectomy with J-pouch with diverting loop ileostomy   • CYSTOSCOPY RETROGRADE PYELOGRAM Left 06/03/2013    Dr. Cr Yee   • HYSTERECTOMY  2003   • ILEOSTOMY CLOSURE  07/2001   • LAPAROSCOPIC CHOLECYSTECTOMY N/A 09/03/2010    w/ cholangiogram and adhesiolysis-Dr. Nathan Avila   • OOPHORECTOMY     • TUMOR REMOVAL      abdomen   • UPPER GASTROINTESTINAL ENDOSCOPY N/A 09/01/2011    Normal esophagus, normal stomach, erythematous duodenopathy-Dr. Jai Khan   • UPPER GASTROINTESTINAL ENDOSCOPY N/A 05/20/2010    Normal esophagus, normal stomach, normal duodneum-Dr. Jai Khan       FAMILY HISTORY  Family History   Problem Relation Age of Onset   • Diabetes Mother    • Hypertension Mother    • Arthritis Mother         RHEUMATOID   • Colon polyps Mother    • Heart disease Father    • Hypertension Father    • Stroke Father    • Osteoporosis Maternal Grandmother    • Colon cancer Paternal Grandmother         unsure of age   • Breast cancer Neg Hx    • Ovarian cancer Neg Hx    • Uterine cancer Neg Hx    • Deep vein thrombosis Neg Hx    • Pulmonary embolism Neg Hx    • Melanoma Neg Hx    • Prostate cancer Neg Hx        SOCIAL HISTORY  Social History     Socioeconomic History   • Marital status: Single     Spouse name: Not on file   • Number of children: 2   • Years of education: 14   • Highest education level: Not on file   Social Needs   • Financial resource strain: Not on file   • Food insecurity - worry: Not on file   • Food insecurity - inability: Not on file   • Transportation needs - medical: Not on file   • Transportation needs - non-medical: Not on file   Occupational History   • Occupation: office mgr     Comment: [employer]   Tobacco Use   • Smoking status: Never Smoker   • Smokeless tobacco: Never Used   Substance and Sexual Activity   • Alcohol use: No     Frequency: Never     Comment: socially   • Drug use: Defer   • Sexual activity: Defer      Birth control/protection: Surgical     Comment: hysterectomy   Other Topics Concern   • Not on file   Social History Narrative   • Not on file         ALLERGIES  Iodine; Reglan [metoclopramide]; and Darvon [propoxyphene]    REVIEW OF SYSTEMS  Review of Systems   Constitutional: Negative.  Negative for activity change, appetite change ( decreased), chills, fatigue and fever.   HENT: Negative.  Negative for congestion, ear pain, rhinorrhea and sore throat.    Eyes: Negative.    Respiratory: Negative.  Negative for cough and shortness of breath.    Cardiovascular: Negative.  Negative for chest pain, palpitations and leg swelling ( pedal).   Gastrointestinal: Positive for abdominal pain, diarrhea, nausea and vomiting. Negative for constipation.   Endocrine: Negative.    Genitourinary: Negative.  Negative for decreased urine volume, difficulty urinating, dysuria, menstrual problem, pelvic pain, urgency and vaginal discharge.   Musculoskeletal: Negative.  Negative for back pain.   Skin: Negative.  Negative for rash.   Allergic/Immunologic: Negative.    Neurological: Negative.  Negative for dizziness, weakness, light-headedness, numbness and headaches.   Hematological: Negative.    Psychiatric/Behavioral: Negative.  The patient is not nervous/anxious.    All other systems reviewed and are negative.      PHYSICAL EXAM  ED Triage Vitals   Temp Heart Rate Resp BP SpO2   02/14/19 1613 02/14/19 1613 02/14/19 1613 -- 02/14/19 1613   98.7 °F (37.1 °C) (!) 121 18  93 %      Temp src Heart Rate Source Patient Position BP Location FiO2 (%)   02/14/19 1613 02/14/19 1630 -- -- --   Tympanic Monitor          Physical Exam   Constitutional: She is oriented to person, place, and time. She appears distressed.   HENT:   Head: Normocephalic and atraumatic.   Eyes: EOM are normal. Pupils are equal, round, and reactive to light.   Neck: Normal range of motion. Neck supple.   Cardiovascular: Regular rhythm and normal heart sounds. Tachycardia  present.   HR 100s   Pulmonary/Chest: Effort normal and breath sounds normal. No respiratory distress.   Abdominal: Soft. There is no tenderness. There is no rebound and no guarding.   Minimal bowel sounds   Musculoskeletal: Normal range of motion. She exhibits no edema.   Neurological: She is alert and oriented to person, place, and time. She has normal sensation and normal strength.   Skin: Skin is warm and dry. No rash noted.   Psychiatric: Mood and affect normal.   Nursing note and vitals reviewed.      LAB RESULTS  Recent Results (from the past 24 hour(s))   Comprehensive Metabolic Panel    Collection Time: 02/14/19  5:47 PM   Result Value Ref Range    Glucose 91 65 - 99 mg/dL    BUN 8 6 - 20 mg/dL    Creatinine 2.99 (H) 0.57 - 1.00 mg/dL    Sodium 140 136 - 145 mmol/L    Potassium 3.8 3.5 - 5.2 mmol/L    Chloride 96 (L) 98 - 107 mmol/L    CO2 20.8 (L) 22.0 - 29.0 mmol/L    Calcium 10.4 8.6 - 10.5 mg/dL    Total Protein 8.5 6.0 - 8.5 g/dL    Albumin 4.60 3.50 - 5.20 g/dL    ALT (SGPT) 18 1 - 33 U/L    AST (SGOT) 18 1 - 32 U/L    Alkaline Phosphatase 59 39 - 117 U/L    Total Bilirubin 0.5 0.1 - 1.2 mg/dL    eGFR Non African Amer 16 (L) >60 mL/min/1.73    Globulin 3.9 gm/dL    A/G Ratio 1.2 g/dL    BUN/Creatinine Ratio 2.7 (L) 7.0 - 25.0    Anion Gap 23.2 mmol/L   Protime-INR    Collection Time: 02/14/19  5:47 PM   Result Value Ref Range    Protime 12.7 11.7 - 14.2 Seconds    INR 0.97 0.90 - 1.10   Lipase    Collection Time: 02/14/19  5:47 PM   Result Value Ref Range    Lipase 193 (H) 13 - 60 U/L   Lactic Acid, Plasma    Collection Time: 02/14/19  5:47 PM   Result Value Ref Range    Lactate 2.8 (C) 0.5 - 2.0 mmol/L   CBC Auto Differential    Collection Time: 02/14/19  5:47 PM   Result Value Ref Range    WBC 19.82 (H) 3.40 - 10.80 10*3/mm3    RBC 4.00 3.77 - 5.28 10*6/mm3    Hemoglobin 12.4 12.0 - 15.9 g/dL    Hematocrit 38.1 34.0 - 46.6 %    MCV 95.3 79.0 - 97.0 fL    MCH 31.0 26.6 - 33.0 pg    MCHC 32.5 31.5 -  35.7 g/dL    RDW 12.5 12.3 - 15.4 %    RDW-SD 43.5 37.0 - 54.0 fl    MPV 9.8 6.0 - 12.0 fL    Platelets 562 (H) 140 - 450 10*3/mm3    Neutrophil % 83.9 (H) 42.7 - 76.0 %    Lymphocyte % 8.5 (L) 19.6 - 45.3 %    Monocyte % 5.3 5.0 - 12.0 %    Eosinophil % 0.5 0.3 - 6.2 %    Basophil % 0.3 0.0 - 1.5 %    Immature Grans % 1.5 (H) 0.0 - 0.5 %    Neutrophils, Absolute 16.65 (H) 1.40 - 7.00 10*3/mm3    Lymphocytes, Absolute 1.68 0.70 - 3.10 10*3/mm3    Monocytes, Absolute 1.05 (H) 0.10 - 0.90 10*3/mm3    Eosinophils, Absolute 0.09 0.00 - 0.40 10*3/mm3    Basophils, Absolute 0.06 0.00 - 0.20 10*3/mm3    Immature Grans, Absolute 0.29 (H) 0.00 - 0.05 10*3/mm3    nRBC 0.0 0.0 - 0.0 /100 WBC       I ordered the above labs and reviewed the results    RADIOLOGY  CT Abdomen Pelvis Without Contrast   Final Result   Status post colectomy. CT findings suspicious for small   bowel obstruction as described. A fluid collection in the presacral   region has increased in size somewhat since the preceding CT scan dated   8/29/2014, and now contains a tiny amount of air. There is also mild   stranding of the fat adjacent to the collection that is new. These   findings raise the possibility  of an infected fluid collection.       This report was finalized on 2/14/2019 7:43 PM by Dr. Macho Mays M.D.              I ordered the above noted radiological studies and reviewed the images on the PACS system.    EKG    ekg was interpreted by Dr. Locke      MEDICAL RECORD REVIEW  Pt has hx of UC w/ colostomy, hx of persistent partial Mid to distal SBO 2/11/19 and pt demanded her NGT at that time. Imaging on 2/11/19 showed 1. Persistent partial mid to distal small bowel obstruction. When compared to the prior examination, some of the mid small bowel loops appear to be slightly more distended. Additional small bowel loops within the pelvis remain clustered and thick walled.  There is mild associated mesenteric edema as well as mild perihepatic  "ascites.  2. Persistent ill-defined partially loculated appearing fluid collection within the pelvis. Differential would include a postoperative seroma or abscess.  3. Prior colectomy with right lower quadrant diverting ileostomy.  4. Increasing mild right basilar atelectasis/infiltrate.  1/31/19--Pt had exploratory lap by Dr. Street.    PROCEDURES      PROGRESS AND CONSULTS  1650  Reviewed pt's history and workup with Dr. Locke. Care turned over to Dr. Locke  After a bedside evaluation; Dr. Locke agrees with the plan of care    1812  Discussed the pt w/ Sinan, mid-level on call for Dr. Street. They advised they would consult and to have the intensivist admit.     1844  Discussed the pt w/ Benoit, on call midlevel for Dr. Jane. They agreed to admit to the ICU.    COURSE & MEDICAL DECISION MAKING  Pertinent Labs and Imaging studies that were ordered and reviewed are noted above.  Results were reviewed/discussed with the patient and they were also made aware of online assess.   Pt also made aware that some labs, such as cultures, will not be resulted during ER visit and follow up with PMD is necessary.     MEDICATIONS GIVEN IN ER  Medications   vancomycin 1250 mg/250 mL 0.9% NS IVPB ([facility]) (not administered)   sodium chloride 0.9 % bolus 1,000 mL (1,000 mL Intravenous New Bag 2/14/19 1647)   ondansetron (ZOFRAN) injection 4 mg (4 mg Intravenous Given 2/14/19 1806)   piperacillin-tazobactam (ZOSYN) 3.375 g in iso-osmotic dextrose 50 ml (premix) (0 g Intravenous Stopped 2/14/19 1926)       BP 94/69   Pulse 96   Temp 98.7 °F (37.1 °C) (Tympanic)   Resp 18   Ht 160 cm (63\")   Wt 59 kg (130 lb)   SpO2 93%   BMI 23.03 kg/m²     Pt transferred to [facility].    Final diagnoses:   Generalized abdominal pain   Sepsis, due to unspecified organism (CMS/HCC)   Small bowel obstruction (CMS/HCC)     Documentation assistance provided by boom Conteh for TYLER Thurman.  " Information recorded by the scribe was done at my direction and has been verified and validated by me.     Heather Conteh  02/14/19 1919       Cady Maddox APRN  02/14/19 1952

## 2019-02-14 NOTE — ED TRIAGE NOTES
Pt reports she had a tumor on her small intestines at [facility]. Pt reports she was released yesterday. PT reports nausea and vomiting started today.

## 2019-02-14 NOTE — ED TRIAGE NOTES
Pt was dc'd from [facility] last night after being in hospital x 2 weeks. Pt had tumor removed from abdomen during stay. Pt reports feeling okay at dc. Pt reports last night she started having abd pain, nausea, dry heaving, and increase output of ileostomy.

## 2019-02-14 NOTE — ED NOTES
Pt has fentanyl patch on left arm.    Pt has steristips and dressing on abdomen from surgery     Conine Bales RN  02/14/19 0115

## 2019-02-14 NOTE — ED PROVIDER NOTES
Pt presents to the ED c/o vomiting and nausea which began last night, after being released from [facility]. The pt had surgery at [facility] due to pelvic mass.      On exam,   Mid epigastric tenderness without rebound or guarding, ostomy in RLQ with stool in bag  Midline incision to abd which is well healing     Plan: Order the pt a CT abd pelvis for further evaluation.      Attestation:  The CHEMO and I have discussed this patient's history, physical exam, and treatment plan.  I have reviewed the documentation and personally had a face to face interaction with the patient. I affirm the documentation and agree with the treatment and plan.  The attached note describes my personal findings.     Documentation assistance provided by boom Perales for Dr. Locke.  Information recorded by the boom was done at my direction and has been verified and validated by me.       Ricarda Perales  02/14/19 5733       Wei Locke MD  02/14/19 5079

## 2019-02-15 ENCOUNTER — APPOINTMENT (OUTPATIENT)
Dept: GENERAL RADIOLOGY | Facility: HOSPITAL | Age: 54
End: 2019-02-15

## 2019-02-15 VITALS
HEIGHT: 63 IN | WEIGHT: 130 LBS | RESPIRATION RATE: 18 BRPM | BODY MASS INDEX: 23.04 KG/M2 | HEART RATE: 121 BPM | SYSTOLIC BLOOD PRESSURE: 102 MMHG | OXYGEN SATURATION: 97 % | DIASTOLIC BLOOD PRESSURE: 61 MMHG | TEMPERATURE: 100.6 F

## 2019-02-15 PROCEDURE — 96376 TX/PRO/DX INJ SAME DRUG ADON: CPT

## 2019-02-15 PROCEDURE — 71045 X-RAY EXAM CHEST 1 VIEW: CPT

## 2019-02-15 RX ORDER — LIDOCAINE HYDROCHLORIDE 10 MG/ML
10 INJECTION, SOLUTION EPIDURAL; INFILTRATION; INTRACAUDAL; PERINEURAL ONCE
Status: DISCONTINUED | OUTPATIENT
Start: 2019-02-15 | End: 2019-02-15

## 2019-02-15 NOTE — ED NOTES
JOSÉ MIGUEL called to arrange a ride for the pt to be transferred, they report that for an ACLS crew eta around 0100     Pao Nava RN  02/14/19 2122

## 2019-02-15 NOTE — ED NOTES
JOSÉ MIGUEL called for update on ETA, per dispatch, pt is next on the list for ACLS, at this time the crew just picked up patient on ventilator and ETA approx 1 hour. Family updated and charge nurse updated     Elizabeth Bishop RN  02/15/19 0028

## 2019-02-15 NOTE — ED NOTES
IV fluids paused due to pt reports of burning at IV site. At this time family reports will wait for EMS transfer.     Pao Nava, RN  02/14/19 8274

## 2019-02-15 NOTE — ED NOTES
PER JOSÉ MIGUEL Johnson dispatch, ACLS team was just cleared from [facility] and is enroute to [facility] ETA approx 15-20 minutes.     Elizabeth Bishop RN  02/15/19 0107

## 2019-02-15 NOTE — ED PROVIDER NOTES
Central Line At Bedside  Date/Time: 2/15/2019 12:54 AM  Performed by: Edenilson Almaraz MD  Authorized by: Edenilson Almaraz MD     Consent:     Consent obtained:  Verbal and written    Consent given by:  Patient  Universal protocol:     Procedure explained and questions answered to patient or proxy's satisfaction: yes      Relevant documents present and verified: yes      Test results available and properly labeled: yes      Imaging studies available: yes      Required blood products, implants, devices, and special equipment available: yes      Site/side marked: yes      Immediately prior to procedure, a time out was called: yes      Patient identity confirmed:  Verbally with patient  Pre-procedure details:     Hand hygiene: Hand hygiene performed prior to insertion      Sterile barrier technique: All elements of maximal sterile technique followed      Skin preparation:  Betadine    Skin preparation agent: Skin preparation agent completely dried prior to procedure    Anesthesia (see MAR for exact dosages):     Anesthesia method:  Local infiltration    Local anesthetic:  Lidocaine 1% w/o epi  Procedure details:     Location:  R subclavian    Patient position:  Trendelenburg    Procedural supplies:  Triple lumen    Catheter size:  7 Fr    Landmarks identified: yes      Ultrasound guidance: no      Number of attempts:  1    Successful placement: yes    Post-procedure details:     Post-procedure:  Line sutured (3 sutures of 3-0 Nylon)    Assessment:  Placement verified by x-ray, no pneumothorax on x-ray and blood return through all ports    Patient tolerance of procedure:  Tolerated well, no immediate complications             Documentation assistance provided by boom Block for Dr. Sung MD.  Information recorded by the scribe was done at my direction and has been verified and validated by me.       Birdie Block  02/15/19 0136       Edenilson Almaraz MD  02/15/19 0157

## 2019-02-15 NOTE — ED NOTES
No answer when called [facility] for potential availability to transfer pt to [facility]. Family updated and is considering wanting to take pt over themselves. Will discuss if this is an option w RN and ED Pao Stewart, RN  02/14/19 1831

## 2019-02-15 NOTE — ED NOTES
[facility] called to ask about potential eta for ALS crew to transfer pt and they report could transfer pt at 0045, this rn answers that will wait for Pao Au, RN  02/14/19 6736

## 2019-02-19 LAB
BACTERIA SPEC AEROBE CULT: NORMAL
BACTERIA SPEC AEROBE CULT: NORMAL

## 2019-02-27 ENCOUNTER — OFFICE (OUTPATIENT)
Dept: URBAN - METROPOLITAN AREA CLINIC 66 | Facility: CLINIC | Age: 54
End: 2019-02-27

## 2019-02-27 VITALS
HEIGHT: 63 IN | DIASTOLIC BLOOD PRESSURE: 72 MMHG | SYSTOLIC BLOOD PRESSURE: 120 MMHG | HEART RATE: 118 BPM | WEIGHT: 129 LBS

## 2019-02-27 DIAGNOSIS — R11.0 NAUSEA: ICD-10-CM

## 2019-02-27 DIAGNOSIS — K94.19 OTHER COMPLICATIONS OF ENTEROSTOMY: ICD-10-CM

## 2019-02-27 DIAGNOSIS — Z93.2 ILEOSTOMY STATUS: ICD-10-CM

## 2019-02-27 PROCEDURE — 99213 OFFICE O/P EST LOW 20 MIN: CPT | Performed by: INTERNAL MEDICINE

## 2019-03-01 ENCOUNTER — LAB (OUTPATIENT)
Dept: LAB | Facility: HOSPITAL | Age: 54
End: 2019-03-01

## 2019-03-01 ENCOUNTER — TRANSCRIBE ORDERS (OUTPATIENT)
Dept: ADMINISTRATIVE | Facility: HOSPITAL | Age: 54
End: 2019-03-01

## 2019-03-01 DIAGNOSIS — Z43.2 ILEOSTOMY BAG CHANGED (HCC): Primary | ICD-10-CM

## 2019-03-01 DIAGNOSIS — R19.8 HIGH OUTPUT ILEOSTOMY (HCC): ICD-10-CM

## 2019-03-01 DIAGNOSIS — Z43.2 ILEOSTOMY BAG CHANGED (HCC): ICD-10-CM

## 2019-03-01 DIAGNOSIS — Z93.2 HIGH OUTPUT ILEOSTOMY (HCC): ICD-10-CM

## 2019-03-01 LAB
25(OH)D3 SERPL-MCNC: 27.1 NG/ML
ALBUMIN SERPL-MCNC: 4 G/DL (ref 3.5–5.2)
ALBUMIN/GLOB SERPL: 1.3 G/DL
ALP SERPL-CCNC: 52 U/L (ref 40–129)
ALT SERPL W P-5'-P-CCNC: 25 U/L (ref 5–33)
ANION GAP SERPL CALCULATED.3IONS-SCNC: 14.2 MMOL/L
AST SERPL-CCNC: 24 U/L (ref 5–32)
BASOPHILS # BLD AUTO: 0.12 10*3/MM3 (ref 0–0.2)
BASOPHILS NFR BLD AUTO: 1.6 % (ref 0–1.5)
BILIRUB SERPL-MCNC: 0.4 MG/DL (ref 0.2–1.2)
BUN BLD-MCNC: 10 MG/DL (ref 6–20)
BUN/CREAT SERPL: 12 (ref 7–25)
CALCIUM SPEC-SCNC: 9.3 MG/DL (ref 8.6–10.5)
CHLORIDE SERPL-SCNC: 102 MMOL/L (ref 98–107)
CO2 SERPL-SCNC: 22.8 MMOL/L (ref 22–29)
CORTIS SERPL-MCNC: 2.04 MCG/DL
CREAT BLD-MCNC: 0.83 MG/DL (ref 0.57–1)
DEPRECATED RDW RBC AUTO: 46.3 FL (ref 37–54)
EOSINOPHIL # BLD AUTO: 0.3 10*3/MM3 (ref 0–0.4)
EOSINOPHIL NFR BLD AUTO: 4 % (ref 0.3–6.2)
ERYTHROCYTE [DISTWIDTH] IN BLOOD BY AUTOMATED COUNT: 13.3 % (ref 12.3–15.4)
GFR SERPL CREATININE-BSD FRML MDRD: 72 ML/MIN/1.73
GLOBULIN UR ELPH-MCNC: 3 GM/DL
GLUCOSE BLD-MCNC: 95 MG/DL (ref 65–99)
HCT VFR BLD AUTO: 30.9 % (ref 34–46.6)
HGB BLD-MCNC: 10.1 G/DL (ref 12–15.9)
IMM GRANULOCYTES # BLD AUTO: 0.03 10*3/MM3 (ref 0–0.05)
IMM GRANULOCYTES NFR BLD AUTO: 0.4 % (ref 0–0.5)
LYMPHOCYTES # BLD AUTO: 2.23 10*3/MM3 (ref 0.7–3.1)
LYMPHOCYTES NFR BLD AUTO: 29.9 % (ref 19.6–45.3)
MCH RBC QN AUTO: 31.1 PG (ref 26.6–33)
MCHC RBC AUTO-ENTMCNC: 32.7 G/DL (ref 31.5–35.7)
MCV RBC AUTO: 95.1 FL (ref 79–97)
MONOCYTES # BLD AUTO: 0.42 10*3/MM3 (ref 0.1–0.9)
MONOCYTES NFR BLD AUTO: 5.6 % (ref 5–12)
NEUTROPHILS # BLD AUTO: 4.35 10*3/MM3 (ref 1.4–7)
NEUTROPHILS NFR BLD AUTO: 58.5 % (ref 42.7–76)
NRBC BLD AUTO-RTO: 0 /100 WBC (ref 0–0)
PLATELET # BLD AUTO: 310 10*3/MM3 (ref 140–450)
PMV BLD AUTO: 8.9 FL (ref 6–12)
POTASSIUM BLD-SCNC: 4 MMOL/L (ref 3.5–5.2)
PROT SERPL-MCNC: 7 G/DL (ref 6–8.5)
RBC # BLD AUTO: 3.25 10*6/MM3 (ref 3.77–5.28)
SODIUM BLD-SCNC: 139 MMOL/L (ref 136–145)
T3FREE SERPL-MCNC: 3.41 PG/ML (ref 2–4.4)
T4 FREE SERPL-MCNC: 0.84 NG/DL (ref 0.93–1.7)
TSH SERPL DL<=0.05 MIU/L-ACNC: 1.55 MIU/ML (ref 0.27–4.2)
VIT B12 BLD-MCNC: 591 PG/ML (ref 232–1245)
WBC NRBC COR # BLD: 7.45 10*3/MM3 (ref 3.4–10.8)

## 2019-03-01 PROCEDURE — 80053 COMPREHEN METABOLIC PANEL: CPT

## 2019-03-01 PROCEDURE — 84443 ASSAY THYROID STIM HORMONE: CPT

## 2019-03-01 PROCEDURE — 84439 ASSAY OF FREE THYROXINE: CPT

## 2019-03-01 PROCEDURE — 82607 VITAMIN B-12: CPT

## 2019-03-01 PROCEDURE — 36415 COLL VENOUS BLD VENIPUNCTURE: CPT

## 2019-03-01 PROCEDURE — 85025 COMPLETE CBC W/AUTO DIFF WBC: CPT

## 2019-03-01 PROCEDURE — 82533 TOTAL CORTISOL: CPT

## 2019-03-01 PROCEDURE — 84481 FREE ASSAY (FT-3): CPT

## 2019-03-01 PROCEDURE — 82306 VITAMIN D 25 HYDROXY: CPT

## 2019-03-11 RX ORDER — ESTRADIOL 1 MG/1
TABLET ORAL
Qty: 90 TABLET | Refills: 3 | Status: SHIPPED | OUTPATIENT
Start: 2019-03-11 | End: 2019-08-23 | Stop reason: SDUPTHER

## 2019-06-03 ENCOUNTER — TELEPHONE (OUTPATIENT)
Dept: OBSTETRICS AND GYNECOLOGY | Age: 54
End: 2019-06-03

## 2019-06-06 ENCOUNTER — OFFICE VISIT (OUTPATIENT)
Dept: OBSTETRICS AND GYNECOLOGY | Age: 54
End: 2019-06-06

## 2019-06-06 VITALS
WEIGHT: 133 LBS | BODY MASS INDEX: 23.57 KG/M2 | SYSTOLIC BLOOD PRESSURE: 144 MMHG | DIASTOLIC BLOOD PRESSURE: 98 MMHG | HEIGHT: 63 IN

## 2019-06-06 DIAGNOSIS — N89.8 VAGINAL DISCHARGE: ICD-10-CM

## 2019-06-06 DIAGNOSIS — B37.31 YEAST VAGINITIS: Primary | ICD-10-CM

## 2019-06-06 PROCEDURE — 99213 OFFICE O/P EST LOW 20 MIN: CPT | Performed by: NURSE PRACTITIONER

## 2019-06-06 RX ORDER — ALPRAZOLAM 1 MG/1
1 TABLET ORAL 2 TIMES DAILY PRN
COMMUNITY
End: 2022-03-18

## 2019-06-06 NOTE — PROGRESS NOTES
[facility]  Routine Annual Visit    2019    Patient: Gustavo Maria          MR#:[id number]      History of Present Illness    53 y.o. female  who presents with complaint of intense vaginal itching intermittent x months. Also has noticed vaginal discharge with slight odor. She has taken several diflucan which improve symptoms for a short period of time. PT reports hx frequent resistant yeast infections requiring genetian violet- requests this therapy today.     No LMP recorded (lmp unknown). Patient has had a hysterectomy.  Obstetric History:  OB History      Para Term  AB Living    2 2 2     2    SAB TAB Ectopic Molar Multiple Live Births              2         Menstrual History:     No LMP recorded (lmp unknown). Patient has had a hysterectomy.       Sexual History:       ________________________________________  Patient Active Problem List   Diagnosis   • Crohn's disease (CMS/HCC)   • Ileostomy in place (CMS/[facility])   • Vulvovaginal candidiasis   • Well female exam with routine gynecological exam   • Intractable migraine with aura without status migrainosus       Past Medical History:   Diagnosis Date   • Anxiety    • Asthma    • Crohn's disease (CMS/HCC)    • Depression    • GERD (gastroesophageal reflux disease)    • H/O ileostomy    • Headache, migraine    • Herpes    • History of MRSA infection    • History of transfusion    • Kidney stones    • Ulcerative colitis (CMS/HCC)        Past Surgical History:   Procedure Laterality Date   • ABDOMINOPERINEAL PROCTOCOLECTOMY N/A 2002    with permanent ileostomy   •  SECTION  ,    • COLECTOMY TOTAL N/A     Total abdominal colectomy with J-pouch with diverting loop ileostomy   • CYSTOSCOPY RETROGRADE PYELOGRAM Left 2013    Dr. Cr Yee   • HYSTERECTOMY     • ILEOSTOMY CLOSURE  2001   • LAPAROSCOPIC CHOLECYSTECTOMY N/A 2010    w/ cholangiogram and adhesiolysis-Dr. Nathan Avila    • OOPHORECTOMY     • TUMOR REMOVAL  01/31/2019    tumor removed from piece of ovary w/bowel resection.    • UPPER GASTROINTESTINAL ENDOSCOPY N/A 09/01/2011    Normal esophagus, normal stomach, erythematous duodenopathy-Dr. Jai Khan   • UPPER GASTROINTESTINAL ENDOSCOPY N/A 05/20/2010    Normal esophagus, normal stomach, normal duodneum-Dr. Jai Khan       Social History     Tobacco Use   Smoking Status Never Smoker   Smokeless Tobacco Never Used       Family History   Problem Relation Age of Onset   • Diabetes Mother    • Hypertension Mother    • Arthritis Mother         RHEUMATOID   • Colon polyps Mother    • Heart disease Father    • Hypertension Father    • Stroke Father    • Osteoporosis Maternal Grandmother    • Colon cancer Paternal Grandmother         unsure of age   • Breast cancer Neg Hx    • Ovarian cancer Neg Hx    • Uterine cancer Neg Hx    • Deep vein thrombosis Neg Hx    • Pulmonary embolism Neg Hx    • Melanoma Neg Hx    • Prostate cancer Neg Hx        Prior to Admission medications    Medication Sig Start Date End Date Taking? Authorizing Provider   ALPRAZolam (XANAX) 1 MG tablet Take 1 mg by mouth 2 (Two) Times a Day As Needed for Anxiety.   Yes Cristina Norris MD   Cetirizine HCl (ZYRTEC ALLERGY PO) Take 10 mg by mouth Daily.   Yes Cristina Norris MD   eletriptan (RELPAX) 40 MG tablet Take one tablet by mouth at onset of migraine. May repeat in 2 hours if needed. Do not exceed 2 tablets in 24 hours. 10/18/18  Yes Chao Salas APRN   EPINEPHrine (EPIPEN) 0.3 MG/0.3ML solution auto-injector injection  12/5/17  Yes Cristina Norris MD   estradiol (ESTRACE) 1 MG tablet TAKE 1 TABLET EVERY DAY 3/11/19  Yes Burak Morley MD   fluticasone (FLONASE) 50 MCG/ACT nasal spray 2 sprays into each nostril Daily.   Yes Cristina Norris MD   Multiple Vitamins-Minerals (MULTIVITAMIN PO) Take 1 tablet by mouth Daily.   Yes Cristina Norris MD   omeprazole  (PriLOSEC) 40 MG capsule Take 40 mg by mouth Daily. 10/3/16  Yes Provider, MD Cristina   ondansetron (ZOFRAN) 8 MG tablet Take 8 mg by mouth Every 8 (Eight) Hours As Needed. 12/1/17  Yes ProviderCristina MD   rizatriptan (MAXALT) 10 MG tablet Take 1 tablet by mouth 1 (One) Time As Needed for Migraine. May repeat in 2 hours if needed 10/18/18  Yes Chao Salas APRN   Ketorolac Tromethamine (SPRIX) 15.75 MG/SPRAY solution Use one spray per nostril once daily as needed for headache. 10/18/18   Chao Salas APRN   nortriptyline (PAMELOR) 25 MG capsule Take 2 capsules by mouth Every Night. 10/18/18   Chao Salas APRN     ________________________________________    The following portions of the patient's history were reviewed and updated as appropriate: allergies, current medications, past family history, past medical history, past social history, past surgical history and problem list.    Review of Systems   Constitutional: Negative for chills, fatigue and fever.   Gastrointestinal: Negative for abdominal distention and abdominal pain.   Genitourinary: Positive for vaginal discharge. Negative for decreased urine volume, difficulty urinating, dyspareunia, dysuria, flank pain, frequency, genital sores, menstrual problem, pelvic pain, urgency, vaginal bleeding and vaginal pain.       Objective   Physical Exam   Constitutional: She is oriented to person, place, and time. She appears well-developed and well-nourished. No distress.   Genitourinary: There is no tenderness, lesion or injury on the right labia. There is no tenderness, lesion or injury on the left labia. No erythema, tenderness or bleeding in the vagina. No foreign body in the vagina. No signs of injury around the vagina. Vaginal discharge found.   Genitourinary Comments: Generalized vulvar erythema   Grayish-whitish discharge present in vagina   Neurological: She is alert and oriented to person, place, and time.   Skin: Skin is  "warm and dry.   Psychiatric: She has a normal mood and affect. Her behavior is normal.     Geneitan violet therapy applied to vuvla       /98   Ht 160 cm (63\")   Wt 60.3 kg (133 lb)   LMP  (LMP Unknown)   Breastfeeding? No   BMI 23.56 kg/m²    BP Readings from Last 3 Encounters:   06/06/19 144/98   02/15/19 102/61   10/18/18 152/88      Wt Readings from Last 3 Encounters:   06/06/19 60.3 kg (133 lb)   02/14/19 59 kg (130 lb)   10/18/18 61.2 kg (135 lb)      BP elevated today- recommend keeping an eye on and rechecking    BMI: Estimated body mass index is 23.56 kg/m² as calculated from the following:    Height as of this encounter: 160 cm (63\").    Weight as of this encounter: 60.3 kg (133 lb).      Assessment:    1. Yeast vaginitis- nuswab; venetian violet therapy applied; return next week for repeat application   2. Vaginal discharge- nuswab sent    Plan:    []  Rx:   []  Mammogram request made  []  PAP done  []  Occult fecal blood test ([facility])  []  Labs: nuswab   []  Nuswab  []  DEXA scan   []  Referral for colonoscopy:           TYLER Carter  6/6/2019 2:15 PM  "

## 2019-06-12 ENCOUNTER — TELEPHONE (OUTPATIENT)
Dept: OBSTETRICS AND GYNECOLOGY | Age: 54
End: 2019-06-12

## 2019-06-12 LAB
A VAGINAE DNA VAG QL NAA+PROBE: ABNORMAL SCORE
BVAB2 DNA VAG QL NAA+PROBE: ABNORMAL SCORE
C ALBICANS DNA VAG QL NAA+PROBE: POSITIVE
C GLABRATA DNA VAG QL NAA+PROBE: NEGATIVE
C TRACH RRNA SPEC QL NAA+PROBE: NEGATIVE
MEGA1 DNA VAG QL NAA+PROBE: ABNORMAL SCORE
N GONORRHOEA RRNA SPEC QL NAA+PROBE: NEGATIVE
T VAGINALIS RRNA SPEC QL NAA+PROBE: NEGATIVE

## 2019-06-12 NOTE — TELEPHONE ENCOUNTER
----- Message from TYLER Brennan sent at 6/12/2019  8:52 AM EDT -----  Please inform patient yeast only returned positive on her vaginal culture as we suspected.

## 2019-06-14 ENCOUNTER — OFFICE VISIT (OUTPATIENT)
Dept: OBSTETRICS AND GYNECOLOGY | Age: 54
End: 2019-06-14

## 2019-06-14 VITALS
SYSTOLIC BLOOD PRESSURE: 154 MMHG | HEIGHT: 63 IN | DIASTOLIC BLOOD PRESSURE: 102 MMHG | WEIGHT: 130 LBS | BODY MASS INDEX: 23.04 KG/M2

## 2019-06-14 DIAGNOSIS — K50.80 CROHN'S DISEASE OF BOTH SMALL AND LARGE INTESTINE WITHOUT COMPLICATION (HCC): ICD-10-CM

## 2019-06-14 DIAGNOSIS — B37.31 VULVOVAGINAL CANDIDIASIS: Primary | ICD-10-CM

## 2019-06-14 DIAGNOSIS — I10 ESSENTIAL HYPERTENSION: ICD-10-CM

## 2019-06-14 DIAGNOSIS — Z93.2 ILEOSTOMY IN PLACE (HCC): ICD-10-CM

## 2019-06-14 PROBLEM — N17.9 AKI (ACUTE KIDNEY INJURY): Status: ACTIVE | Noted: 2019-02-15

## 2019-06-14 PROBLEM — Z82.49 FAMILY HISTORY OF PREMATURE CORONARY HEART DISEASE: Status: ACTIVE | Noted: 2019-01-14

## 2019-06-14 PROCEDURE — 99213 OFFICE O/P EST LOW 20 MIN: CPT | Performed by: OBSTETRICS & GYNECOLOGY

## 2019-06-14 RX ORDER — LISINOPRIL 10 MG/1
10 TABLET ORAL DAILY
COMMUNITY
Start: 2019-06-14 | End: 2019-07-14

## 2019-06-14 NOTE — PROGRESS NOTES
2019      Patient:  Gustavo Maria   MR#:[id number]    Office note    Chief Complaint   Patient presents with   • Follow-up     2nd treatment of gentian violet.  Last done 19 by Maureen.  Patient seen PCP today for increased blood pressures.  Started on Lisinopril, however, hasn't started yet.        Subjective     History of Present Illness  53 y.o. female  for follow-up on external vulvar itching secondary to yeast.  The problem is long-standing and chronic for the patient with previous vulvar cultures showing resistant Candida glabrata.  The patient reports intermittent mild vaginal itching now with some improvement with last application    Blood pressure is notably elevated today.  The patient's hypertension is treated by her primary care doctor and she is not filled the latest prescription in the regimen for treatment      Patient Active Problem List   Diagnosis   • Crohn's disease (CMS/HCC)   • Ileostomy in place (CMS/HCC)   • Vulvovaginal candidiasis   • Well female exam with routine gynecological exam   • Intractable migraine with aura without status migrainosus   • HASMUKH (acute kidney injury) (CMS/HCC)   • Essential hypertension   • Family history of premature coronary heart disease       Past Medical History:   Diagnosis Date   • Anxiety    • Asthma    • Crohn's disease (CMS/HCC)    • Depression    • GERD (gastroesophageal reflux disease)    • H/O ileostomy    • Headache, migraine    • Herpes    • History of MRSA infection    • History of transfusion    • Kidney stones    • Ulcerative colitis (CMS/HCC)        Past Surgical History:   Procedure Laterality Date   • ABDOMINOPERINEAL PROCTOCOLECTOMY N/A 2002    with permanent ileostomy   •  SECTION  ,    • COLECTOMY TOTAL N/A     Total abdominal colectomy with J-pouch with diverting loop ileostomy   • CYSTOSCOPY RETROGRADE PYELOGRAM Left 2013    Dr. Cr Yee   • HYSTERECTOMY     • ILEOSTOMY CLOSURE   2001   • LAPAROSCOPIC CHOLECYSTECTOMY N/A 2010    w/ cholangiogram and adhesiolysis-Dr. Nathan Avila   • OOPHORECTOMY     • TUMOR REMOVAL  2019    tumor removed from piece of ovary w/bowel resection.    • UPPER GASTROINTESTINAL ENDOSCOPY N/A 2011    Normal esophagus, normal stomach, erythematous duodenopathy-Dr. Jai Khan   • UPPER GASTROINTESTINAL ENDOSCOPY N/A 2010    Normal esophagus, normal stomach, normal duodneum-Dr. Jai Khan       Obstetric History:  OB History      Para Term  AB Living    2 2 2     2    SAB TAB Ectopic Molar Multiple Live Births              2         Menstrual History:     No LMP recorded (lmp unknown). Patient has had a hysterectomy.       #: 1, Date: 88, Sex: Male, Weight: 2835 g (6 lb 4 oz), GA: 40w0d, Delivery: , Low Transverse, Apgar1: None, Apgar5: None, Living: Living, Birth Comments: None    #: 2, Date: [birth year], Sex: Male, Weight: 3374 g (7 lb 7 oz), GA: 40w0d, Delivery: , Low Transverse, Apgar1: None, Apgar5: None, Living: Living, Birth Comments: None      Family History   Problem Relation Age of Onset   • Diabetes Mother    • Hypertension Mother    • Arthritis Mother         RHEUMATOID   • Colon polyps Mother    • Heart disease Father    • Hypertension Father    • Stroke Father    • Osteoporosis Maternal Grandmother    • Colon cancer Paternal Grandmother         unsure of age   • Breast cancer Neg Hx    • Ovarian cancer Neg Hx    • Uterine cancer Neg Hx    • Deep vein thrombosis Neg Hx    • Pulmonary embolism Neg Hx    • Melanoma Neg Hx    • Prostate cancer Neg Hx        Social History     Tobacco Use   • Smoking status: Never Smoker   • Smokeless tobacco: Never Used   Substance Use Topics   • Alcohol use: No     Frequency: Never     Comment: socially   • Drug use: No       Iodine; Metoclopramide; and Propoxyphene      Current Outpatient Medications:   •  ALPRAZolam (XANAX) 1 MG tablet, Take 1 mg  by mouth 2 (Two) Times a Day As Needed for Anxiety., Disp: , Rfl:   •  Cetirizine HCl (ZYRTEC ALLERGY PO), Take 10 mg by mouth Daily., Disp: , Rfl:   •  eletriptan (RELPAX) 40 MG tablet, Take one tablet by mouth at onset of migraine. May repeat in 2 hours if needed. Do not exceed 2 tablets in 24 hours., Disp: 9 tablet, Rfl: 5  •  EPINEPHrine (EPIPEN) 0.3 MG/0.3ML solution auto-injector injection, , Disp: , Rfl:   •  estradiol (ESTRACE) 1 MG tablet, TAKE 1 TABLET EVERY DAY, Disp: 90 tablet, Rfl: 3  •  fluticasone (FLONASE) 50 MCG/ACT nasal spray, 2 sprays into each nostril Daily., Disp: , Rfl:   •  Ketorolac Tromethamine (SPRIX) 15.75 MG/SPRAY solution, Use one spray per nostril once daily as needed for headache., Disp: 5 each, Rfl: 3  •  lisinopril (PRINIVIL,ZESTRIL) 10 MG tablet, Take 10 mg by mouth Daily., Disp: , Rfl:   •  Multiple Vitamins-Minerals (MULTIVITAMIN PO), Take 1 tablet by mouth Daily., Disp: , Rfl:   •  nortriptyline (PAMELOR) 25 MG capsule, Take 2 capsules by mouth Every Night., Disp: 180 capsule, Rfl: 1  •  omeprazole (PriLOSEC) 40 MG capsule, Take 40 mg by mouth Daily., Disp: , Rfl:   •  ondansetron (ZOFRAN) 8 MG tablet, Take 8 mg by mouth Every 8 (Eight) Hours As Needed., Disp: , Rfl:   •  rizatriptan (MAXALT) 10 MG tablet, Take 1 tablet by mouth 1 (One) Time As Needed for Migraine. May repeat in 2 hours if needed, Disp: 18 tablet, Rfl: 5    The following portions of the patient's history were reviewed and updated as appropriate: allergies, current medications, past family history, past medical history, past social history, past surgical history and problem list.    Review of Systems   Constitutional: Negative.    Respiratory: Negative.    Cardiovascular: Negative.    Gastrointestinal: Negative.    Genitourinary: Negative.         Vulvar irritation and itching    Psychiatric/Behavioral: Negative.        BP Readings from Last 3 Encounters:   06/14/19 (!) 154/102   06/06/19 144/98   02/15/19 102/61  "     Wt Readings from Last 3 Encounters:   06/14/19 59 kg (130 lb)   06/06/19 60.3 kg (133 lb)   02/14/19 59 kg (130 lb)      BMI: Estimated body mass index is 23.03 kg/m² as calculated from the following:    Height as of this encounter: 160 cm (63\").    Weight as of this encounter: 59 kg (130 lb).  BSA: Estimated body surface area is 1.61 meters squared as calculated from the following:    Height as of this encounter: 160 cm (63\").    Weight as of this encounter: 59 kg (130 lb).    Objective   Physical Exam   Constitutional: She is oriented to person, place, and time. She appears well-developed and well-nourished.   HENT:   Head: Normocephalic and atraumatic.   Cardiovascular: Normal rate.   Pulmonary/Chest: Effort normal.   Abdominal: Soft. Bowel sounds are normal. She exhibits no distension. There is no tenderness.   Genitourinary: Vagina normal.   Genitourinary Comments: Vulva remarkable for mild atrophy otherwise overt symptoms of yeast are not present   Neurological: She is alert and oriented to person, place, and time.   Skin: Skin is warm and dry.   Psychiatric: She has a normal mood and affect. Her behavior is normal. Judgment and thought content normal.   Nursing note and vitals reviewed.        Assessment/Plan     Gustavo was seen today for follow-up.    Diagnoses and all orders for this visit:    Vulvovaginal candidiasis    Crohn's disease of both small and large intestine without complication (CMS/HCC)    Ileostomy in place (CMS/HCC)    Essential hypertension      Genitan Maria Luisa applied to the vulva and vagina    No Follow-up on file.        Burak Morley MD   6/14/2019 1:29 PM  "

## 2019-06-19 ENCOUNTER — OFFICE (OUTPATIENT)
Dept: URBAN - METROPOLITAN AREA CLINIC 66 | Facility: CLINIC | Age: 54
End: 2019-06-19

## 2019-06-19 VITALS
SYSTOLIC BLOOD PRESSURE: 118 MMHG | HEIGHT: 63 IN | WEIGHT: 132 LBS | HEART RATE: 88 BPM | DIASTOLIC BLOOD PRESSURE: 74 MMHG

## 2019-06-19 DIAGNOSIS — R11.0 NAUSEA: ICD-10-CM

## 2019-06-19 DIAGNOSIS — Z93.2 ILEOSTOMY STATUS: ICD-10-CM

## 2019-06-19 DIAGNOSIS — D50.9 IRON DEFICIENCY ANEMIA, UNSPECIFIED: ICD-10-CM

## 2019-06-19 DIAGNOSIS — K66.0 PERITONEAL ADHESIONS (POSTPROCEDURAL) (POSTINFECTION): ICD-10-CM

## 2019-06-19 PROCEDURE — 99213 OFFICE O/P EST LOW 20 MIN: CPT | Performed by: INTERNAL MEDICINE

## 2019-06-28 ENCOUNTER — ANESTHESIA EVENT (OUTPATIENT)
Dept: GASTROENTEROLOGY | Facility: HOSPITAL | Age: 54
End: 2019-06-28

## 2019-06-28 ENCOUNTER — HOSPITAL ENCOUNTER (OUTPATIENT)
Facility: HOSPITAL | Age: 54
Setting detail: HOSPITAL OUTPATIENT SURGERY
Discharge: HOME | End: 2019-06-28
Attending: INTERNAL MEDICINE | Admitting: INTERNAL MEDICINE

## 2019-06-28 ENCOUNTER — ON CAMPUS - OUTPATIENT (OUTPATIENT)
Dept: URBAN - METROPOLITAN AREA HOSPITAL 114 | Facility: HOSPITAL | Age: 54
End: 2019-06-28

## 2019-06-28 ENCOUNTER — ANESTHESIA (OUTPATIENT)
Dept: GASTROENTEROLOGY | Facility: HOSPITAL | Age: 54
End: 2019-06-28

## 2019-06-28 VITALS
DIASTOLIC BLOOD PRESSURE: 59 MMHG | WEIGHT: 128.19 LBS | SYSTOLIC BLOOD PRESSURE: 119 MMHG | TEMPERATURE: 97.8 F | BODY MASS INDEX: 22.71 KG/M2 | HEART RATE: 78 BPM | OXYGEN SATURATION: 100 % | RESPIRATION RATE: 16 BRPM

## 2019-06-28 DIAGNOSIS — K29.50 UNSPECIFIED CHRONIC GASTRITIS WITHOUT BLEEDING: ICD-10-CM

## 2019-06-28 DIAGNOSIS — D50.9 IRON DEFICIENCY ANEMIA, UNSPECIFIED: ICD-10-CM

## 2019-06-28 DIAGNOSIS — D50.9 IRON DEFICIENCY ANEMIA: ICD-10-CM

## 2019-06-28 PROCEDURE — 44361 SMALL BOWEL ENDOSCOPY/BIOPSY: CPT | Performed by: INTERNAL MEDICINE

## 2019-06-28 PROCEDURE — 25010000002 PROPOFOL 10 MG/ML EMULSION: Performed by: ANESTHESIOLOGY

## 2019-06-28 PROCEDURE — 88305 TISSUE EXAM BY PATHOLOGIST: CPT | Performed by: INTERNAL MEDICINE

## 2019-06-28 RX ORDER — LIDOCAINE HYDROCHLORIDE 20 MG/ML
INJECTION, SOLUTION INFILTRATION; PERINEURAL AS NEEDED
Status: DISCONTINUED | OUTPATIENT
Start: 2019-06-28 | End: 2019-06-28 | Stop reason: SURG

## 2019-06-28 RX ORDER — SODIUM CHLORIDE, SODIUM LACTATE, POTASSIUM CHLORIDE, CALCIUM CHLORIDE 600; 310; 30; 20 MG/100ML; MG/100ML; MG/100ML; MG/100ML
1000 INJECTION, SOLUTION INTRAVENOUS CONTINUOUS
Status: DISCONTINUED | OUTPATIENT
Start: 2019-06-28 | End: 2019-06-28 | Stop reason: HOSPADM

## 2019-06-28 RX ORDER — PROPOFOL 10 MG/ML
VIAL (ML) INTRAVENOUS AS NEEDED
Status: DISCONTINUED | OUTPATIENT
Start: 2019-06-28 | End: 2019-06-28 | Stop reason: SURG

## 2019-06-28 RX ADMIN — PROPOFOL 200 MG: 10 INJECTION, EMULSION INTRAVENOUS at 08:30

## 2019-06-28 RX ADMIN — SODIUM CHLORIDE, POTASSIUM CHLORIDE, SODIUM LACTATE AND CALCIUM CHLORIDE 1000 ML: 600; 310; 30; 20 INJECTION, SOLUTION INTRAVENOUS at 08:02

## 2019-06-28 RX ADMIN — PROPOFOL 200 MG: 10 INJECTION, EMULSION INTRAVENOUS at 08:14

## 2019-06-28 RX ADMIN — LIDOCAINE HYDROCHLORIDE 100 MG: 20 INJECTION, SOLUTION INFILTRATION; PERINEURAL at 08:14

## 2019-06-28 NOTE — ANESTHESIA PREPROCEDURE EVALUATION
Anesthesia Evaluation     Patient summary reviewed and Nursing notes reviewed   NPO Solid Status: > 8 hours  NPO Liquid Status: > 2 hours           Airway   Mallampati: III  TM distance: <3 FB  Neck ROM: full  Possible difficult intubation  Dental - normal exam     Pulmonary - normal exam   Cardiovascular - normal exam    (+) hypertension less than 2 medications,       Neuro/Psych  (+) headaches (migraines), psychiatric history,     GI/Hepatic/Renal/Endo      Musculoskeletal     Abdominal    Substance History      OB/GYN          Other                      Anesthesia Plan    ASA 3     MAC     Anesthetic plan, all risks, benefits, and alternatives have been provided, discussed and informed consent has been obtained with: patient.

## 2019-06-28 NOTE — ANESTHESIA POSTPROCEDURE EVALUATION
Patient: Gustavo Maria    Procedure Summary     Date:  06/28/19 Room / Location:   [facility] /  [facility]    Anesthesia Start:  0814 Anesthesia Stop:  0839    Procedure:  ESOPHAGOGASTRODUODENOSCOPY WITH SMALL boweL ENTEROSCOPY with biopsies (N/A Esophagus) Diagnosis:      Surgeon:  Scooby Woodard MD Provider:  Cheryl Echevarria MD    Anesthesia Type:  MAC ASA Status:  3          Anesthesia Type: MAC  Last vitals  BP   130/88 (06/28/19 0730)   Temp   36.6 °C (97.8 °F) (06/28/19 0730)   Pulse   74 (06/28/19 0730)   Resp   14 (06/28/19 0730)     SpO2   100 % (06/28/19 0730)     Post Anesthesia Care and Evaluation    Patient location during evaluation: PACU  Patient participation: complete - patient participated  Level of consciousness: awake  Pain score: 0  Pain management: adequate  Airway patency: patent  Anesthetic complications: No anesthetic complications    Cardiovascular status: acceptable  Respiratory status: acceptable  Hydration status: acceptable    Comments: Blood pressure 130/88, pulse 74, temperature 36.6 °C (97.8 °F), temperature source Oral, resp. rate 14, weight 58.1 kg (128 lb 3 oz), SpO2 100 %, not currently breastfeeding.    No anesthesia care post op

## 2019-07-01 LAB
CYTO UR: NORMAL
LAB AP CASE REPORT: NORMAL
PATH REPORT.FINAL DX SPEC: NORMAL
PATH REPORT.GROSS SPEC: NORMAL

## 2019-08-23 ENCOUNTER — PROCEDURE VISIT (OUTPATIENT)
Dept: OBSTETRICS AND GYNECOLOGY | Age: 54
End: 2019-08-23

## 2019-08-23 ENCOUNTER — APPOINTMENT (OUTPATIENT)
Dept: WOMENS IMAGING | Facility: HOSPITAL | Age: 54
End: 2019-08-23

## 2019-08-23 ENCOUNTER — OFFICE VISIT (OUTPATIENT)
Dept: OBSTETRICS AND GYNECOLOGY | Age: 54
End: 2019-08-23

## 2019-08-23 VITALS
HEIGHT: 63 IN | SYSTOLIC BLOOD PRESSURE: 108 MMHG | WEIGHT: 129 LBS | DIASTOLIC BLOOD PRESSURE: 68 MMHG | BODY MASS INDEX: 22.86 KG/M2

## 2019-08-23 DIAGNOSIS — B85.1: ICD-10-CM

## 2019-08-23 DIAGNOSIS — Z13.89 SCREENING FOR BLOOD OR PROTEIN IN URINE: ICD-10-CM

## 2019-08-23 DIAGNOSIS — Z01.419 WELL FEMALE EXAM WITH ROUTINE GYNECOLOGICAL EXAM: ICD-10-CM

## 2019-08-23 DIAGNOSIS — Z12.31 VISIT FOR SCREENING MAMMOGRAM: Primary | ICD-10-CM

## 2019-08-23 DIAGNOSIS — Z12.72 VAGINAL PAP SMEAR FOLLOWING SURGERY: Primary | ICD-10-CM

## 2019-08-23 DIAGNOSIS — Z11.3 SCREENING FOR STD (SEXUALLY TRANSMITTED DISEASE): ICD-10-CM

## 2019-08-23 DIAGNOSIS — N89.8 VAGINAL DISCHARGE: ICD-10-CM

## 2019-08-23 LAB
BILIRUB BLD-MCNC: NEGATIVE MG/DL
CLARITY, POC: CLEAR
COLOR UR: YELLOW
GLUCOSE UR STRIP-MCNC: NEGATIVE MG/DL
KETONES UR QL: NEGATIVE
LEUKOCYTE EST, POC: NEGATIVE
NITRITE UR-MCNC: NEGATIVE MG/ML
PH UR: 5 [PH] (ref 5–8)
PROT UR STRIP-MCNC: NEGATIVE MG/DL
RBC # UR STRIP: NEGATIVE /UL
SP GR UR: 1.01 (ref 1–1.03)
UROBILINOGEN UR QL: NORMAL

## 2019-08-23 PROCEDURE — 99213 OFFICE O/P EST LOW 20 MIN: CPT | Performed by: OBSTETRICS & GYNECOLOGY

## 2019-08-23 PROCEDURE — 81002 URINALYSIS NONAUTO W/O SCOPE: CPT | Performed by: OBSTETRICS & GYNECOLOGY

## 2019-08-23 PROCEDURE — 77067 SCR MAMMO BI INCL CAD: CPT | Performed by: OBSTETRICS & GYNECOLOGY

## 2019-08-23 PROCEDURE — 77067 SCR MAMMO BI INCL CAD: CPT | Performed by: RADIOLOGY

## 2019-08-23 RX ORDER — ESTRADIOL 1 MG/1
1 TABLET ORAL DAILY
Qty: 90 TABLET | Refills: 3 | Status: SHIPPED | OUTPATIENT
Start: 2019-08-23 | End: 2020-03-10

## 2019-08-23 RX ORDER — LISINOPRIL 10 MG/1
TABLET ORAL
COMMUNITY
Start: 2019-07-08

## 2019-08-23 NOTE — PROGRESS NOTES
2019      Patient:  Gustavo Maria   MR#:[id number]    Office note    Chief Complaint   Patient presents with   • Follow-up     Mammo today in office.  cc:  Currrently in a relationship and would like to be checked for STD's. Also, yeast is better.  However, never feels like it is completely gone.        Subjective     History of Present Illness  53 y.o. female  patient presents requesting routine STD screen.  She is in a new relationship and wants to be screened before intimacy.  She states symptoms of vaginal irritation and discharge related to yeast are markedly improved.  The patient also reports an intermittent mild discharge with periodic odor over the past several months despite reporting irritation and other symptoms seem markedly improved    We discussed Medicare's routine guidelines for annual exam.      Patient Active Problem List   Diagnosis   • Crohn's disease (CMS/HCC)   • Ileostomy in place (CMS/[facility])   • Vulvovaginal candidiasis   • Well female exam with routine gynecological exam   • Intractable migraine with aura without status migrainosus   • HASMUKH (acute kidney injury) (CMS/[facility])   • Essential hypertension   • Family history of premature coronary heart disease       Past Medical History:   Diagnosis Date   • Anxiety    • Asthma    • Crohn's disease (CMS/HCC)    • Depression    • GERD (gastroesophageal reflux disease)    • H/O ileostomy    • Headache, migraine    • Herpes    • History of MRSA infection    • History of transfusion    • Hypertension    • Kidney stones    • Ulcerative colitis (CMS/HCC)        Past Surgical History:   Procedure Laterality Date   • ABDOMINOPERINEAL PROCTOCOLECTOMY N/A 2002    with permanent ileostomy   •  SECTION  ,    • COLECTOMY TOTAL N/A     Total abdominal colectomy with J-pouch with diverting loop ileostomy   • CYSTOSCOPY RETROGRADE PYELOGRAM Left 2013    Dr. Cr Yee   • ENDOSCOPY N/A 2019    Procedure:  ESOPHAGOGASTRODUODENOSCOPY WITH SMALL boweL ENTEROSCOPY with biopsies;  Surgeon: Scooby Woodard MD;  Location: [facility];  Service: Gastroenterology   • HYSTERECTOMY     • ILEOSTOMY CLOSURE  2001   • LAPAROSCOPIC CHOLECYSTECTOMY N/A 2010    w/ cholangiogram and adhesiolysis-Dr. Nathan Avila   • OOPHORECTOMY     • TUMOR REMOVAL  2019    tumor removed from piece of ovary w/bowel resection.    • UPPER GASTROINTESTINAL ENDOSCOPY N/A 2011    Normal esophagus, normal stomach, erythematous duodenopathy-Dr. Jai Khan   • UPPER GASTROINTESTINAL ENDOSCOPY N/A 2010    Normal esophagus, normal stomach, normal duodneum-Dr. Jai Khan       Obstetric History:  OB History      Para Term  AB Living    2 2 2     2    SAB TAB Ectopic Molar Multiple Live Births              2         Menstrual History:     No LMP recorded (lmp unknown). Patient has had a hysterectomy.       #: 1, Date: [birth year], Sex: Male, Weight: 2835 g (6 lb 4 oz), GA: 40w0d, Delivery: , Low Transverse, Apgar1: None, Apgar5: None, Living: Living, Birth Comments: None    #: 2, Date: 89, Sex: Male, Weight: 3374 g (7 lb 7 oz), GA: 40w0d, Delivery: , Low Transverse, Apgar1: None, Apgar5: None, Living: Living, Birth Comments: None      Family History   Problem Relation Age of Onset   • Diabetes Mother    • Hypertension Mother    • Arthritis Mother         RHEUMATOID   • Colon polyps Mother    • Heart disease Father    • Hypertension Father    • Stroke Father    • Osteoporosis Maternal Grandmother    • Colon cancer Paternal Grandmother         unsure of age   • Breast cancer Neg Hx    • Ovarian cancer Neg Hx    • Uterine cancer Neg Hx    • Deep vein thrombosis Neg Hx    • Pulmonary embolism Neg Hx    • Melanoma Neg Hx    • Prostate cancer Neg Hx        Social History     Tobacco Use   • Smoking status: Never Smoker   • Smokeless tobacco: Never Used   Substance Use Topics   •  Alcohol use: No     Frequency: Never     Comment: socially   • Drug use: No       Iodine; Metoclopramide; and Propoxyphene      Current Outpatient Medications:   •  ALPRAZolam (XANAX) 1 MG tablet, Take 1 mg by mouth 2 (Two) Times a Day As Needed for Anxiety., Disp: , Rfl:   •  Cetirizine HCl (ZYRTEC ALLERGY PO), Take 10 mg by mouth Daily., Disp: , Rfl:   •  eletriptan (RELPAX) 40 MG tablet, Take one tablet by mouth at onset of migraine. May repeat in 2 hours if needed. Do not exceed 2 tablets in 24 hours., Disp: 9 tablet, Rfl: 5  •  EPINEPHrine (EPIPEN) 0.3 MG/0.3ML solution auto-injector injection, , Disp: , Rfl:   •  estradiol (ESTRACE) 1 MG tablet, Take 1 tablet by mouth Daily., Disp: 90 tablet, Rfl: 3  •  fluticasone (FLONASE) 50 MCG/ACT nasal spray, 2 sprays into each nostril Daily., Disp: , Rfl:   •  Ketorolac Tromethamine (SPRIX) 15.75 MG/SPRAY solution, Use one spray per nostril once daily as needed for headache., Disp: 5 each, Rfl: 3  •  lisinopril (PRINIVIL,ZESTRIL) 10 MG tablet, TAKE 1 TABLET BY MOUTH ONCE DAILY FOR 30 DAYS, Disp: , Rfl:   •  Multiple Vitamins-Minerals (MULTIVITAMIN PO), Take 1 tablet by mouth Daily., Disp: , Rfl:   •  omeprazole (PriLOSEC) 40 MG capsule, Take 40 mg by mouth Daily., Disp: , Rfl:   •  ondansetron (ZOFRAN) 8 MG tablet, Take 8 mg by mouth Every 8 (Eight) Hours As Needed., Disp: , Rfl:   •  rizatriptan (MAXALT) 10 MG tablet, Take 1 tablet by mouth 1 (One) Time As Needed for Migraine. May repeat in 2 hours if needed, Disp: 18 tablet, Rfl: 5    The following portions of the patient's history were reviewed and updated as appropriate: allergies, current medications, past family history, past medical history, past social history, past surgical history and problem list.    Review of Systems   Constitutional: Negative.    Respiratory: Negative.    Cardiovascular: Negative.    Gastrointestinal: Negative.    Genitourinary: Positive for vaginal discharge.   Psychiatric/Behavioral:  "Negative.        BP Readings from Last 3 Encounters:   08/23/19 108/68   06/28/19 119/59   06/14/19 (!) 154/102      Wt Readings from Last 3 Encounters:   08/23/19 58.5 kg (129 lb)   06/28/19 58.1 kg (128 lb 3 oz)   06/14/19 59 kg (130 lb)      BMI: Estimated body mass index is 22.85 kg/m² as calculated from the following:    Height as of this encounter: 160 cm (63\").    Weight as of this encounter: 58.5 kg (129 lb).  BSA: Estimated body surface area is 1.6 meters squared as calculated from the following:    Height as of this encounter: 160 cm (63\").    Weight as of this encounter: 58.5 kg (129 lb).    Objective   Physical Exam   Constitutional: She is oriented to person, place, and time. She appears well-developed and well-nourished.   HENT:   Head: Normocephalic and atraumatic.   Cardiovascular: Normal rate.   Pulmonary/Chest: Effort normal.   Abdominal: Soft. Bowel sounds are normal. She exhibits no distension. There is no tenderness.   Genitourinary: Vagina normal.   Neurological: She is alert and oriented to person, place, and time.   Skin: Skin is warm and dry.   Psychiatric: She has a normal mood and affect. Her behavior is normal. Judgment and thought content normal.   Nursing note and vitals reviewed.        Assessment/Plan     Gustavo was seen today for follow-up.    Diagnoses and all orders for this visit:    Screening for STD (sexually transmitted disease)  -     NuSwab VG+ - Swab, Vagina    Vaginal discharge  -     NuSwab VG+ - Swab, Vagina    Other orders  -     estradiol (ESTRACE) 1 MG tablet; Take 1 tablet by mouth Daily.          No Follow-up on file.        Burak Morley MD   8/23/2019 10:59 AM  "

## 2019-08-27 ENCOUNTER — TELEPHONE (OUTPATIENT)
Dept: OBSTETRICS AND GYNECOLOGY | Age: 54
End: 2019-08-27

## 2019-08-27 LAB
A VAGINAE DNA VAG QL NAA+PROBE: NORMAL SCORE
BVAB2 DNA VAG QL NAA+PROBE: NORMAL SCORE
C ALBICANS DNA VAG QL NAA+PROBE: NEGATIVE
C GLABRATA DNA VAG QL NAA+PROBE: NEGATIVE
C TRACH RRNA SPEC QL NAA+PROBE: NEGATIVE
MEGA1 DNA VAG QL NAA+PROBE: NORMAL SCORE
N GONORRHOEA RRNA SPEC QL NAA+PROBE: NEGATIVE
T VAGINALIS RRNA SPEC QL NAA+PROBE: NEGATIVE

## 2019-08-27 NOTE — TELEPHONE ENCOUNTER
----- Message from Burak Morley MD sent at 8/27/2019 10:19 AM EDT -----  Call pt:  Comprehensive vaginosis panel is NORMAL.

## 2019-08-28 ENCOUNTER — TELEPHONE (OUTPATIENT)
Dept: OBSTETRICS AND GYNECOLOGY | Age: 54
End: 2019-08-28

## 2019-10-25 ENCOUNTER — OFFICE (OUTPATIENT)
Dept: URBAN - METROPOLITAN AREA CLINIC 66 | Facility: CLINIC | Age: 54
End: 2019-10-25

## 2019-10-25 VITALS
WEIGHT: 135 LBS | HEIGHT: 63 IN | SYSTOLIC BLOOD PRESSURE: 113 MMHG | DIASTOLIC BLOOD PRESSURE: 79 MMHG | HEART RATE: 88 BPM

## 2019-10-25 DIAGNOSIS — D50.9 IRON DEFICIENCY ANEMIA, UNSPECIFIED: ICD-10-CM

## 2019-10-25 DIAGNOSIS — Z93.2 ILEOSTOMY STATUS: ICD-10-CM

## 2019-10-25 DIAGNOSIS — K66.0 PERITONEAL ADHESIONS (POSTPROCEDURAL) (POSTINFECTION): ICD-10-CM

## 2019-10-25 PROCEDURE — 99214 OFFICE O/P EST MOD 30 MIN: CPT | Performed by: NURSE PRACTITIONER

## 2020-03-10 RX ORDER — ESTRADIOL 1 MG/1
TABLET ORAL
Qty: 90 TABLET | Refills: 4 | Status: SHIPPED | OUTPATIENT
Start: 2020-03-10 | End: 2020-12-16

## 2020-04-23 ENCOUNTER — OFFICE (OUTPATIENT)
Dept: URBAN - METROPOLITAN AREA CLINIC 66 | Facility: CLINIC | Age: 55
End: 2020-04-23

## 2020-04-23 ENCOUNTER — TRANSCRIBE ORDERS (OUTPATIENT)
Dept: ADMINISTRATIVE | Facility: HOSPITAL | Age: 55
End: 2020-04-23

## 2020-04-23 VITALS — HEIGHT: 63 IN

## 2020-04-23 DIAGNOSIS — R10.13 ABDOMINAL PAIN, EPIGASTRIC: Primary | ICD-10-CM

## 2020-04-23 DIAGNOSIS — R10.13 EPIGASTRIC PAIN: ICD-10-CM

## 2020-04-23 DIAGNOSIS — R10.32 LEFT LOWER QUADRANT PAIN: ICD-10-CM

## 2020-04-23 DIAGNOSIS — R11.0 NAUSEA: ICD-10-CM

## 2020-04-23 PROCEDURE — 99213 OFFICE O/P EST LOW 20 MIN: CPT | Mod: 95 | Performed by: INTERNAL MEDICINE

## 2020-04-27 ENCOUNTER — HOSPITAL ENCOUNTER (OUTPATIENT)
Dept: CT IMAGING | Facility: HOSPITAL | Age: 55
Discharge: HOME | End: 2020-04-27
Admitting: INTERNAL MEDICINE

## 2020-04-27 DIAGNOSIS — R10.13 ABDOMINAL PAIN, EPIGASTRIC: ICD-10-CM

## 2020-04-27 PROCEDURE — 74176 CT ABD & PELVIS W/O CONTRAST: CPT

## 2020-10-27 ENCOUNTER — OFFICE (OUTPATIENT)
Dept: URBAN - METROPOLITAN AREA CLINIC 66 | Facility: CLINIC | Age: 55
End: 2020-10-27

## 2020-10-27 VITALS
DIASTOLIC BLOOD PRESSURE: 73 MMHG | HEART RATE: 90 BPM | TEMPERATURE: 96.9 F | SYSTOLIC BLOOD PRESSURE: 106 MMHG | HEIGHT: 63 IN | WEIGHT: 135 LBS

## 2020-10-27 DIAGNOSIS — K94.19 OTHER COMPLICATIONS OF ENTEROSTOMY: ICD-10-CM

## 2020-10-27 DIAGNOSIS — K66.0 PERITONEAL ADHESIONS (POSTPROCEDURAL) (POSTINFECTION): ICD-10-CM

## 2020-10-27 DIAGNOSIS — R11.0 NAUSEA: ICD-10-CM

## 2020-10-27 PROCEDURE — 99214 OFFICE O/P EST MOD 30 MIN: CPT | Performed by: INTERNAL MEDICINE

## 2020-12-15 ENCOUNTER — TELEPHONE (OUTPATIENT)
Dept: OBSTETRICS AND GYNECOLOGY | Age: 55
End: 2020-12-15

## 2020-12-16 ENCOUNTER — OFFICE VISIT (OUTPATIENT)
Dept: OBSTETRICS AND GYNECOLOGY | Age: 55
End: 2020-12-16

## 2020-12-16 VITALS
WEIGHT: 133 LBS | BODY MASS INDEX: 23.57 KG/M2 | HEIGHT: 63 IN | DIASTOLIC BLOOD PRESSURE: 74 MMHG | SYSTOLIC BLOOD PRESSURE: 128 MMHG

## 2020-12-16 DIAGNOSIS — A60.04 HERPES, VULVAR: ICD-10-CM

## 2020-12-16 DIAGNOSIS — N89.8 VAGINAL DISCHARGE: Primary | ICD-10-CM

## 2020-12-16 PROCEDURE — 99213 OFFICE O/P EST LOW 20 MIN: CPT | Performed by: PHYSICIAN ASSISTANT

## 2020-12-16 RX ORDER — ACYCLOVIR 800 MG/1
800 TABLET ORAL 3 TIMES DAILY
COMMUNITY
End: 2020-12-16 | Stop reason: ALTCHOICE

## 2020-12-16 RX ORDER — VALACYCLOVIR HYDROCHLORIDE 500 MG/1
500 TABLET, FILM COATED ORAL DAILY
Qty: 30 TABLET | Refills: 0 | Status: SHIPPED | OUTPATIENT
Start: 2020-12-16 | End: 2020-12-16 | Stop reason: SDUPTHER

## 2020-12-16 RX ORDER — VALACYCLOVIR HYDROCHLORIDE 500 MG/1
500 TABLET, FILM COATED ORAL DAILY
Qty: 90 TABLET | Refills: 1 | Status: SHIPPED | OUTPATIENT
Start: 2020-12-16 | End: 2021-03-16

## 2020-12-16 RX ORDER — CLOTRIMAZOLE AND BETAMETHASONE DIPROPIONATE 10; .64 MG/G; MG/G
CREAM TOPICAL EVERY 12 HOURS SCHEDULED
Qty: 15 G | Refills: 0 | Status: SHIPPED | OUTPATIENT
Start: 2020-12-16 | End: 2021-06-25

## 2020-12-16 RX ORDER — CONJUGATED ESTROGENS 0.62 MG/G
CREAM VAGINAL
Qty: 30 G | Refills: 0 | COMMUNITY
Start: 2020-12-16 | End: 2022-03-18

## 2020-12-16 RX ORDER — ACYCLOVIR 50 MG/G
OINTMENT TOPICAL
Qty: 15 G | Refills: 0 | Status: SHIPPED | OUTPATIENT
Start: 2020-12-16 | End: 2020-12-23

## 2020-12-16 NOTE — PROGRESS NOTES
"Subjective     Chief Complaint   Patient presents with   • Gynecologic Exam     c/o vaginal irritation       Gustavo Hernandez is a 55 y.o.  whose LMP is No LMP recorded (lmp unknown). Patient has had a hysterectomy. presents with vaginal irritation  Pt has h/o herpes   Had not been SA for approx 5 years   in September and since then has had 3 outbreaks  She takes acute medication only  This last episode has lasted for 3 wks      No Additional Complaints Reported    The following portions of the patient's history were reviewed and updated as appropriate:vital signs, allergies, current medications, past family history, past medical history, past social history, past surgical history and problem list      Review of Systems   Genitourinary:positive for genital lesions and vaginal discharge     Objective      /74   Ht 160 cm (63\")   Wt 60.3 kg (133 lb)   LMP  (LMP Unknown)   Breastfeeding No   BMI 23.56 kg/m²     Physical Exam    General:   alert, comfortable and no distress   Heart: Not performed today   Lungs: Not performed today.   Breast: Not performed today   Neck: na   Abdomen: {Not performed today   CVA: Not performed today   Pelvis: Erosions/irritation noted on labia majora, no ulcerations noted  Vaginal d/c, white, thick noted, culture obtained  Cervix and uterus absent and no masses noted B  Ostomy bag noted   Extremities: Not performed today   Neurologic: negative   Psychiatric: Normal affect, judgement, and mood       Lab Review   Labs: No data reviewed     Imaging   No data reviewed    Assessment/Plan     ASSESSMENT  1. Vaginal discharge    2. Herpes, vulvar        PLAN  1.   Orders Placed This Encounter   Procedures   • NuSwab VG+ - Swab, Vagina       2. Medications prescribed this encounter:        New Medications Ordered This Visit   Medications   • clotrimazole-betamethasone (Lotrisone) 1-0.05 % cream     Sig: Apply  topically to the appropriate area as directed Every 12 (Twelve) " Hours.     Dispense:  15 g     Refill:  0   • acyclovir (Zovirax) 5 % ointment     Sig: Apply thin layer to affected area     Dispense:  15 g     Refill:  0   • valACYclovir (Valtrex) 500 MG tablet     Sig: Take 1 tablet by mouth Daily for 90 days.     Dispense:  90 tablet     Refill:  1   • conjugated estrogens (Premarin) 0.625 MG/GM vaginal cream     Sig: .5 gms pv 3 times a week     Dispense:  30 g     Refill:  0     Order Specific Question:   Quantity     Answer:   1       3. Pt's sx's seem to be improving. Enc avoidance of soaps and irritating products. Start topical anti inflammatory and anti viral medication prn. Also gave sample of premarin for pt to try, she has used this in the past but noted it made her outbreaks more likely to occur. Will monitor and let me know. Plan lubrication when SA. R/o occult cause of infection as well. Start valtrex daily for prevention of outbreaks    Follow up: 3 month(s)    MEGHAN Pantoja  12/16/2020

## 2020-12-17 ENCOUNTER — TELEPHONE (OUTPATIENT)
Dept: OBSTETRICS AND GYNECOLOGY | Age: 55
End: 2020-12-17

## 2020-12-17 NOTE — TELEPHONE ENCOUNTER
(Brown Pt)     Pt called stating her insurance did not approve the ointment for Zovirax and needs a PA called in for it.     Please advise     Pharmacy verified     [phone]

## 2020-12-19 LAB
A VAGINAE DNA VAG QL NAA+PROBE: NORMAL SCORE
BVAB2 DNA VAG QL NAA+PROBE: NORMAL SCORE
C ALBICANS DNA VAG QL NAA+PROBE: NEGATIVE
C GLABRATA DNA VAG QL NAA+PROBE: NEGATIVE
C TRACH DNA VAG QL NAA+PROBE: NEGATIVE
MEGA1 DNA VAG QL NAA+PROBE: NORMAL SCORE
N GONORRHOEA DNA VAG QL NAA+PROBE: NEGATIVE
T VAGINALIS DNA VAG QL NAA+PROBE: NEGATIVE

## 2020-12-23 NOTE — TELEPHONE ENCOUNTER
Pt notified of PA being approved. Informed her to reach out to her pharmacy to make sure they re-try filling medication

## 2020-12-30 ENCOUNTER — TELEPHONE (OUTPATIENT)
Dept: OBSTETRICS AND GYNECOLOGY | Age: 55
End: 2020-12-30

## 2020-12-30 RX ORDER — FLUCONAZOLE 150 MG/1
150 TABLET ORAL
Qty: 3 TABLET | Refills: 0 | Status: SHIPPED | OUTPATIENT
Start: 2020-12-30 | End: 2021-01-04 | Stop reason: SDUPTHER

## 2020-12-30 RX ORDER — FLUCONAZOLE 150 MG/1
150 TABLET ORAL
Qty: 3 TABLET | Refills: 0 | Status: SHIPPED | OUTPATIENT
Start: 2020-12-30 | End: 2020-12-30 | Stop reason: SDUPTHER

## 2020-12-30 NOTE — TELEPHONE ENCOUNTER
----- Message from Gustavo Hernandez sent at 12/30/2020 11:32 AM EST -----  Regarding: RE: Prescription Question  Contact: [phone]    Yes, please.   ----- Message -----  From: SONG UNGER  Sent: 12/30/20 11:28 AM  To: Gustavo Hernandez  Subject: RE: Prescription Question    I apologize, pepe is out on vacation and I am just now seeing this request.  Are you still in need of medication if so let me know and I will forward to the NP.      ----- Message -----       From:Gustavo Hernandez       Sent:12/26/2020  3:39 PM EST         To:MEGHAN Pantoja    Subject:Prescription Question    I have a horrible yeast infection.  I know we just tested for this and it was negative but the symptoms are severe and I am miserable. I am unable to get into the immediate care center. Please call a prescription  to [facility] in [place] for Diflucan. 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  Also, if I could have a topical for relief, as well.     Thank you!

## 2020-12-30 NOTE — TELEPHONE ENCOUNTER
----- Message from Gustavo Hernandez sent at 12/30/2020  2:36 PM EST -----  Regarding: RE: Prescription Question  Contact: [phone]  [facility] [place] says they do not have any prescriptions called in for me.    Please be sure that they sent it to the correct pharmacy.   [facility] but call in is [facility] [place].     Thank You!     ----- Message -----  From: SONG UNGER  Sent: 12/30/20 12:10 PM  To: Gustavo Hernandez  Subject: RE: Prescription Question    They sent in medication for you.  Check with your pharmacy.      ----- Message -----       From:Gustavo Hernandez       Sent:12/30/2020 11:32 AM EST         To:MEGHAN Pantoja    Subject:RE: Prescription Question      Yes, please.       ----- Message -----       From:SONG UNGER       Sent:12/30/2020 11:28 AM EST         To:Gustavo Hernandez    Subject:RE: Prescription Question    I apologize, pepe is out on vacation and I am just now seeing this request.  Are you still in need of medication if so let me know and I will forward to the NP.      ----- Message -----       From:Gustavo Hernandez       Sent:12/26/2020  3:39 PM EST         To:MEGHAN Pantoja    Subject:Prescription Question    I have a horrible yeast infection.  I know we just tested for this and it was negative but the symptoms are severe and I am miserable. I am unable to get into the immediate care center. Please call a prescription  to [facility] in [place] for Diflucan. 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  Also, if I could have a topical for relief, as well.     Thank you!

## 2021-01-04 ENCOUNTER — OFFICE VISIT (OUTPATIENT)
Dept: OBSTETRICS AND GYNECOLOGY | Age: 56
End: 2021-01-04

## 2021-01-04 VITALS
SYSTOLIC BLOOD PRESSURE: 120 MMHG | WEIGHT: 135 LBS | DIASTOLIC BLOOD PRESSURE: 70 MMHG | HEIGHT: 63 IN | BODY MASS INDEX: 23.92 KG/M2

## 2021-01-04 DIAGNOSIS — K90.89 OTHER SPECIFIED INTESTINAL MALABSORPTION: ICD-10-CM

## 2021-01-04 DIAGNOSIS — Z93.2 ILEOSTOMY IN PLACE (HCC): ICD-10-CM

## 2021-01-04 DIAGNOSIS — B37.31 VULVOVAGINAL CANDIDIASIS: Primary | ICD-10-CM

## 2021-01-04 PROCEDURE — 99213 OFFICE O/P EST LOW 20 MIN: CPT | Performed by: PHYSICIAN ASSISTANT

## 2021-01-04 RX ORDER — BUTOCONAZOLE NITRATE 100 MG/5G
1 CREAM VAGINAL ONCE
Qty: 5 G | Refills: 0 | COMMUNITY
Start: 2021-01-04 | End: 2021-01-04

## 2021-01-04 RX ORDER — FLUCONAZOLE 150 MG/1
150 TABLET ORAL
Qty: 3 TABLET | Refills: 0 | Status: SHIPPED | OUTPATIENT
Start: 2021-01-04 | End: 2021-01-11 | Stop reason: SDUPTHER

## 2021-01-04 NOTE — PROGRESS NOTES
"Subjective     Chief Complaint   Patient presents with   • Gynecologic Exam     yeast infection       Gustavo Hernandez is a 55 y.o.  whose LMP is No LMP recorded (lmp unknown). Patient has had a hysterectomy. presents with yeast infection    Pt was seen by me in early Dec with c/o vaginal irritation  The swab done at that time was neg for yeast, bv and std's  She started with yeast sx's on Burak day  Used otc monistat ovules and eventually received diflucan x 2 from our office  Her sx's have improved but she wants to be proactive and prevent worsening sx's  Has h/o severe yeast infection that required topical gentian violet  She has h/o ileostomy and doesn't absorb medications well  Usually requires 3 dose of diflucan to improve sx's    She is SA now and wonders if that is contributing to her sx's      No Additional Complaints Reported    The following portions of the patient's history were reviewed and updated as appropriate:vital signs, allergies, current medications, past family history, past medical history, past social history, past surgical history and problem list      Review of Systems   Genitourinary:positive for vaginal discharge     Objective      /70   Ht 160 cm (63\")   Wt 61.2 kg (135 lb)   LMP  (LMP Unknown)   Breastfeeding No   BMI 23.91 kg/m²     Physical Exam    General:   alert, comfortable and no distress   Heart: Not performed today   Lungs: Not performed today.   Breast: Not performed today   Neck: na   Abdomen: {Not performed today   CVA: Not performed today   Pelvis: External genitalia: normal general appearance  Vaginal: normal mucosa without prolapse or lesions and discharge, white   Extremities: Not performed today   Neurologic: negative   Psychiatric: Normal affect, judgement, and mood       Lab Review   Labs: No data reviewed     Imaging   No data reviewed    Assessment/Plan     ASSESSMENT  1. Vulvovaginal candidiasis    2. Ileostomy in place (CMS/formerly [facility])    3. Other " specified intestinal malabsorption        PLAN  1.   Orders Placed This Encounter   Procedures   • NuSwab BV & Candida - , Vagina       2. Medications prescribed this encounter:        New Medications Ordered This Visit   Medications   • Butoconazole Nitrate, 1 Dose, (Gynazole-1) 2 % cream     Sig: Insert 1 application into the vagina 1 (One) Time for 1 dose.     Dispense:  5 g     Refill:  0     Order Specific Question:   Quantity     Answer:   1   • fluconazole (Diflucan) 150 MG tablet     Sig: Take 1 tablet by mouth Every 3 (Three) Days.     Dispense:  3 tablet     Refill:  0       3. Sample of gynazole given and also sent in diflucan x 3.  Will also send culture as well.     Follow up: 1 year(s)    MEGHAN Pantoja  1/4/2021

## 2021-01-08 LAB
A VAGINAE DNA VAG QL NAA+PROBE: NORMAL SCORE
BVAB2 DNA VAG QL NAA+PROBE: NORMAL SCORE
C ALBICANS DNA VAG QL NAA+PROBE: NEGATIVE
C GLABRATA DNA VAG QL NAA+PROBE: NEGATIVE
MEGA1 DNA VAG QL NAA+PROBE: NORMAL SCORE

## 2021-01-11 ENCOUNTER — TELEPHONE (OUTPATIENT)
Dept: OBSTETRICS AND GYNECOLOGY | Age: 56
End: 2021-01-11

## 2021-01-11 RX ORDER — FLUCONAZOLE 150 MG/1
150 TABLET ORAL
Qty: 3 TABLET | Refills: 0 | Status: SHIPPED | OUTPATIENT
Start: 2021-01-11 | End: 2022-03-18

## 2021-01-11 NOTE — TELEPHONE ENCOUNTER
----- Message from MEGHAN Ngo sent at 1/11/2021  9:23 AM EST -----  Let her know she is neg for bv and yeast. I did treat her for yeast so if she already used that, it is ok. Hope she is feeling better

## 2021-06-25 ENCOUNTER — OFFICE VISIT (OUTPATIENT)
Dept: OBSTETRICS AND GYNECOLOGY | Age: 56
End: 2021-06-25

## 2021-06-25 ENCOUNTER — PROCEDURE VISIT (OUTPATIENT)
Dept: OBSTETRICS AND GYNECOLOGY | Age: 56
End: 2021-06-25

## 2021-06-25 ENCOUNTER — APPOINTMENT (OUTPATIENT)
Dept: WOMENS IMAGING | Facility: HOSPITAL | Age: 56
End: 2021-06-25

## 2021-06-25 VITALS
SYSTOLIC BLOOD PRESSURE: 114 MMHG | BODY MASS INDEX: 24.1 KG/M2 | HEIGHT: 63 IN | WEIGHT: 136 LBS | DIASTOLIC BLOOD PRESSURE: 70 MMHG

## 2021-06-25 DIAGNOSIS — Z12.31 VISIT FOR SCREENING MAMMOGRAM: Primary | ICD-10-CM

## 2021-06-25 DIAGNOSIS — Z01.419 WELL FEMALE EXAM WITH ROUTINE GYNECOLOGICAL EXAM: Primary | ICD-10-CM

## 2021-06-25 DIAGNOSIS — I10 ESSENTIAL HYPERTENSION: ICD-10-CM

## 2021-06-25 DIAGNOSIS — N17.9 AKI (ACUTE KIDNEY INJURY) (HCC): ICD-10-CM

## 2021-06-25 DIAGNOSIS — K50.00 CROHN'S DISEASE OF SMALL INTESTINE WITHOUT COMPLICATION (HCC): ICD-10-CM

## 2021-06-25 PROBLEM — K56.609 SMALL BOWEL OBSTRUCTION (HCC): Status: ACTIVE | Noted: 2019-10-07

## 2021-06-25 PROBLEM — G43.119 INTRACTABLE MIGRAINE WITH AURA WITHOUT STATUS MIGRAINOSUS: Status: RESOLVED | Noted: 2018-10-18 | Resolved: 2021-06-25

## 2021-06-25 PROBLEM — G43.909 MIGRAINE WITHOUT STATUS MIGRAINOSUS, NOT INTRACTABLE: Status: ACTIVE | Noted: 2021-04-26

## 2021-06-25 PROCEDURE — 77063 BREAST TOMOSYNTHESIS BI: CPT | Performed by: OBSTETRICS & GYNECOLOGY

## 2021-06-25 PROCEDURE — 77067 SCR MAMMO BI INCL CAD: CPT | Performed by: RADIOLOGY

## 2021-06-25 PROCEDURE — 99396 PREV VISIT EST AGE 40-64: CPT | Performed by: OBSTETRICS & GYNECOLOGY

## 2021-06-25 PROCEDURE — 77067 SCR MAMMO BI INCL CAD: CPT | Performed by: OBSTETRICS & GYNECOLOGY

## 2021-06-25 PROCEDURE — 77063 BREAST TOMOSYNTHESIS BI: CPT | Performed by: RADIOLOGY

## 2021-06-25 RX ORDER — ONDANSETRON 8 MG/1
TABLET, ORALLY DISINTEGRATING ORAL
COMMUNITY
Start: 2021-04-14

## 2021-06-25 RX ORDER — VALACYCLOVIR HYDROCHLORIDE 500 MG/1
TABLET, FILM COATED ORAL
COMMUNITY
Start: 2021-05-27 | End: 2022-03-18 | Stop reason: SDUPTHER

## 2021-06-25 RX ORDER — GALCANEZUMAB 120 MG/ML
INJECTION, SOLUTION SUBCUTANEOUS
COMMUNITY
Start: 2021-05-12

## 2021-06-25 RX ORDER — ACYCLOVIR 400 MG/1
TABLET ORAL
COMMUNITY
Start: 2021-04-14 | End: 2022-11-08 | Stop reason: SDUPTHER

## 2021-06-25 RX ORDER — ESTRADIOL 1 MG/1
TABLET ORAL
COMMUNITY
Start: 2021-06-02 | End: 2022-03-18 | Stop reason: SDUPTHER

## 2021-06-25 NOTE — PROGRESS NOTES
Routine Annual Visit    2021    Patient: Gustavo Hernandez          MR#:[id number]    History of Present Illness    Chief Complaint   Patient presents with   • Gynecologic Exam     hysterectomy, mg today        55 y.o. female  who presents for annual exam.    The patient presents feeling well without any problems.  Past medical history is complicated by Crohn's disease and she has an ostomy.  Remotely the patient's had a small bowel obstruction but has done well in the past years.  She has chronic hypertension that is well treated with her current medication.    In the remote past the patient had problems with recurrent vaginal yeast that is been essentially stable and she states improved since she is  and resumed intimacy.    Studies reviewed:    No LMP recorded (lmp unknown). Patient has had a hysterectomy.  Obstetric History:  OB History        2    Para   2    Term   2            AB        Living   2       SAB        TAB        Ectopic        Molar        Multiple        Live Births   2               Menstrual History:     No LMP recorded (lmp unknown). Patient has had a hysterectomy.       Sexual History:       ________________________________________  Patient Active Problem List   Diagnosis   • Crohn's disease (CMS/HCC)   • Ileostomy in place (CMS/HCC)   • Vulvovaginal candidiasis   • Well female exam with routine gynecological exam   • HASMUKH (acute kidney injury) (CMS/HCC)   • Essential hypertension   • Family history of premature coronary heart disease   • Small bowel obstruction (CMS/HCC)   • Migraine without status migrainosus, not intractable     Past Medical History:   Diagnosis Date   • Anxiety    • Asthma    • Crohn's disease (CMS/HCC)    • Depression    • GERD (gastroesophageal reflux disease)    • H/O ileostomy    • Headache, migraine    • Herpes    • History of MRSA infection    • History of transfusion    • Hypertension    • Kidney stones    • Ulcerative colitis  (CMS/HCC)      Past Surgical History:   Procedure Laterality Date   • ABDOMINOPERINEAL PROCTOCOLECTOMY N/A 2002    with permanent ileostomy   •  SECTION  ,    • COLECTOMY TOTAL N/A     Total abdominal colectomy with J-pouch with diverting loop ileostomy   • CYSTOSCOPY RETROGRADE PYELOGRAM Left 2013    Dr. Cr Yee   • ENDOSCOPY N/A 2019    Procedure: ESOPHAGOGASTRODUODENOSCOPY WITH SMALL boweL ENTEROSCOPY with biopsies;  Surgeon: Scooby Woodard MD;  Location: [facility] ENDOSCOPY;  Service: Gastroenterology   • HYSTERECTOMY     • ILEOSTOMY CLOSURE  2001   • LAPAROSCOPIC CHOLECYSTECTOMY N/A 2010    w/ cholangiogram and adhesiolysis-Dr. Nathan Avila   • OOPHORECTOMY     • TUMOR REMOVAL  2019    tumor removed from piece of ovary w/bowel resection.    • UPPER GASTROINTESTINAL ENDOSCOPY N/A 2011    Normal esophagus, normal stomach, erythematous duodenopathy-Dr. Jai Khan   • UPPER GASTROINTESTINAL ENDOSCOPY N/A 2010    Normal esophagus, normal stomach, normal duodneum-Dr. Jai Khan     Social History     Tobacco Use   Smoking Status Never Smoker   Smokeless Tobacco Never Used     Family History   Problem Relation Age of Onset   • Diabetes Mother    • Hypertension Mother    • Arthritis Mother         RHEUMATOID   • Colon polyps Mother    • Heart disease Father    • Hypertension Father    • Stroke Father    • Osteoporosis Maternal Grandmother    • Colon cancer Paternal Grandmother         unsure of age   • Breast cancer Neg Hx    • Ovarian cancer Neg Hx    • Uterine cancer Neg Hx    • Deep vein thrombosis Neg Hx    • Pulmonary embolism Neg Hx    • Melanoma Neg Hx    • Prostate cancer Neg Hx      Prior to Admission medications    Medication Sig Start Date End Date Taking? Authorizing Provider   acyclovir (ZOVIRAX) 400 MG tablet TAKE ONE TABLET BY MOUTH TWICE DAILY FOR FIVE DAYS FOR outbreak AS NEEDED. CALL [facility] FOR REFILL 21  Yes  ProviderCristina MD   ALPRAZolam (XANAX) 1 MG tablet Take 1 mg by mouth 2 (Two) Times a Day As Needed for Anxiety.   Yes Cristina Norris MD   Cetirizine HCl (ZYRTEC ALLERGY PO) Take 10 mg by mouth Daily.   Yes Cristina Norris MD   conjugated estrogens (Premarin) 0.625 MG/GM vaginal cream .5 gms pv 3 times a week 12/16/20  Yes Tiffanie Jacobson PA   eletriptan (RELPAX) 40 MG tablet Take one tablet by mouth at onset of migraine. May repeat in 2 hours if needed. Do not exceed 2 tablets in 24 hours. 10/18/18  Yes Chao Salas APRN   Emgality 120 MG/ML auto-injector pen  5/12/21  Yes Cristina Norris MD   EPINEPHrine (EPIPEN) 0.3 MG/0.3ML solution auto-injector injection  12/5/17  Yes Cristina Norris MD   estradiol (ESTRACE) 1 MG tablet  6/2/21  Yes ProviderCristina MD   fluconazole (Diflucan) 150 MG tablet Take 1 tablet by mouth Every 3 (Three) Days. 1/11/21  Yes Tiffanie Jacobson PA   fluticasone (FLONASE) 50 MCG/ACT nasal spray 2 sprays into each nostril Daily.   Yes ProviderCristina MD   lisinopril (PRINIVIL,ZESTRIL) 10 MG tablet TAKE 1 TABLET BY MOUTH ONCE DAILY FOR 30 DAYS 7/8/19  Yes Cristina Norris MD   Multiple Vitamins-Minerals (MULTIVITAMIN PO) Take 1 tablet by mouth Daily.   Yes ProviderCristina MD   omeprazole (PriLOSEC) 40 MG capsule Take 40 mg by mouth Daily. 10/3/16  Yes Cristina Norris MD   ondansetron ODT (ZOFRAN-ODT) 8 MG disintegrating tablet TAKE ONE TABLET BY MOUTH AND allow TO dissolve ONCE DAILY AS NEEDED. CALL [facility] FOR REFILL 4/14/21  Yes Cristina Norris MD   rizatriptan (MAXALT) 10 MG tablet Take 1 tablet by mouth 1 (One) Time As Needed for Migraine. May repeat in 2 hours if needed 10/18/18  Yes Chao Salas APRN   valACYclovir (VALTREX) 500 MG tablet  5/27/21  Yes Provider, Historical, MD   clotrimazole-betamethasone (Lotrisone) 1-0.05 % cream Apply  topically to the appropriate area as directed Every 12  "(Twelve) Hours. 12/16/20 6/25/21  Tiffanie Jacobson PA   Ketorolac Tromethamine (SPRIX) 15.75 MG/SPRAY solution Use one spray per nostril once daily as needed for headache. 10/18/18 6/25/21  Chao Salas APRN   ondansetron (ZOFRAN) 8 MG tablet Take 8 mg by mouth Every 8 (Eight) Hours As Needed. 12/1/17 6/25/21  Provider, MD Cristina     ________________________________________    Current contraception: status post hysterectomy  History of abnormal Pap smear: no  Family history of uterine or ovarian cancer: no  Family History of colon cancer/colon polyps: no  History of abnormal mammogram: no  History of abnormal lipids: no    The following portions of the patient's history were reviewed and updated as appropriate: allergies, current medications, past family history, past medical history, past social history, past surgical history and problem list.    Review of Systems    Pertinent items are noted in HPI.       Objective   Physical Exam    /70   Ht 160 cm (63\")   Wt 61.7 kg (136 lb)   LMP  (LMP Unknown)   BMI 24.09 kg/m²    BP Readings from Last 3 Encounters:   06/25/21 114/70   01/04/21 120/70   12/16/20 128/74      Wt Readings from Last 3 Encounters:   06/25/21 61.7 kg (136 lb)   01/04/21 61.2 kg (135 lb)   12/16/20 60.3 kg (133 lb)        BMI: Estimated body mass index is 24.09 kg/m² as calculated from the following:    Height as of this encounter: 160 cm (63\").    Weight as of this encounter: 61.7 kg (136 lb).       General: alert, appears stated age and cooperative   Heart: regular rate and rhythm, S1, S2 normal, no murmur, click, rub or gallop   Lungs: clear to auscultation bilaterally   Abdomen: Ostomy in the left lower quadrant and soft, non-tender, without masses, no organomegaly   Breast: inspection negative, no nipple discharge or bleeding, no masses or nodularity palpable   External genitalia/Vulva: moderate atrophy, External genitalia including bartholin's glands, Urethra, [place]'s " gland and urethra meatus are normal, Perineum, rectum and anus appear normal  and Bladder appears normal without significant prolapse    Vagina: normal mucosa, normal discharge, atrophic appearance    Cervix: absent   Uterus: absent    Adnexa: normal adnexa     As part of wellness and prevention, the following topics were discussed with the patient:  Encouraged self breast exam      Assessment:    Diagnoses and all orders for this visit:    1. Well female exam with routine gynecological exam (Primary)    2. Essential hypertension    3. Crohn's disease of small intestine without complication (CMS/[facility])    4. HASMUKH (acute kidney injury) (CMS/[facility])        Plan:  Return in about 1 year (around 6/25/2022) for Annual GYN exam.      Burak Morley MD  6/25/2021 10:05 EDT

## 2021-10-27 ENCOUNTER — OFFICE (OUTPATIENT)
Dept: URBAN - METROPOLITAN AREA CLINIC 66 | Facility: CLINIC | Age: 56
End: 2021-10-27

## 2021-10-27 VITALS
HEIGHT: 63 IN | DIASTOLIC BLOOD PRESSURE: 68 MMHG | SYSTOLIC BLOOD PRESSURE: 110 MMHG | HEART RATE: 89 BPM | WEIGHT: 142 LBS

## 2021-10-27 DIAGNOSIS — R10.9 UNSPECIFIED ABDOMINAL PAIN: ICD-10-CM

## 2021-10-27 DIAGNOSIS — R19.7 DIARRHEA, UNSPECIFIED: ICD-10-CM

## 2021-10-27 PROCEDURE — 99214 OFFICE O/P EST MOD 30 MIN: CPT | Performed by: INTERNAL MEDICINE

## 2021-10-27 RX ORDER — METRONIDAZOLE 250 MG/1
750 TABLET, FILM COATED ORAL
Qty: 42 | Refills: 0 | Status: ACTIVE
Start: 2021-10-27

## 2022-03-17 PROBLEM — E78.1 HYPERTRIGLYCERIDEMIA: Status: ACTIVE | Noted: 2021-10-29

## 2022-03-17 PROBLEM — M25.541 ARTHRALGIA OF RIGHT HAND: Status: ACTIVE | Noted: 2022-02-11

## 2022-03-17 RX ORDER — CETIRIZINE HYDROCHLORIDE 10 MG/1
10 TABLET ORAL DAILY
COMMUNITY
Start: 2022-02-18

## 2022-03-17 RX ORDER — ESTRADIOL 1 MG/1
1 TABLET ORAL DAILY
COMMUNITY
Start: 2022-02-11 | End: 2022-12-16 | Stop reason: SDUPTHER

## 2022-03-17 RX ORDER — SIMETHICONE 80 MG
TABLET,CHEWABLE ORAL
COMMUNITY
Start: 2022-02-11

## 2022-03-17 RX ORDER — VALACYCLOVIR HYDROCHLORIDE 500 MG/1
500 TABLET, FILM COATED ORAL DAILY
COMMUNITY
Start: 2022-02-11 | End: 2023-02-11

## 2022-03-17 RX ORDER — ALPRAZOLAM 1 MG/1
1 TABLET, ORALLY DISINTEGRATING ORAL 3 TIMES DAILY PRN
COMMUNITY
Start: 2022-02-11 | End: 2022-03-18

## 2022-03-17 RX ORDER — ALBUTEROL SULFATE 90 UG/1
2 AEROSOL, METERED RESPIRATORY (INHALATION)
COMMUNITY
Start: 2022-01-21 | End: 2022-03-18

## 2022-03-18 ENCOUNTER — OFFICE VISIT (OUTPATIENT)
Dept: SURGERY | Facility: CLINIC | Age: 57
End: 2022-03-18

## 2022-03-18 VITALS
WEIGHT: 141.6 LBS | BODY MASS INDEX: 24.17 KG/M2 | HEIGHT: 64 IN | DIASTOLIC BLOOD PRESSURE: 84 MMHG | OXYGEN SATURATION: 99 % | SYSTOLIC BLOOD PRESSURE: 112 MMHG | HEART RATE: 85 BPM | TEMPERATURE: 97.8 F

## 2022-03-18 DIAGNOSIS — N89.8: Primary | ICD-10-CM

## 2022-03-18 PROCEDURE — 99203 OFFICE O/P NEW LOW 30 MIN: CPT | Performed by: COLON & RECTAL SURGERY

## 2022-03-18 RX ORDER — PREDNISONE 50 MG/1
TABLET ORAL
Qty: 3 TABLET | Refills: 0 | Status: SHIPPED | OUTPATIENT
Start: 2022-03-18 | End: 2022-07-08

## 2022-03-18 RX ORDER — ALPRAZOLAM 1 MG/1
1 TABLET, ORALLY DISINTEGRATING ORAL NIGHTLY PRN
COMMUNITY

## 2022-04-12 ENCOUNTER — HOSPITAL ENCOUNTER (OUTPATIENT)
Dept: CT IMAGING | Facility: HOSPITAL | Age: 57
Discharge: HOME | End: 2022-04-12
Admitting: COLON & RECTAL SURGERY

## 2022-04-12 DIAGNOSIS — N89.8: ICD-10-CM

## 2022-04-12 LAB — CREAT BLDA-MCNC: 0.9 MG/DL (ref 0.6–1.3)

## 2022-04-12 PROCEDURE — 82565 ASSAY OF CREATININE: CPT

## 2022-04-12 PROCEDURE — 25010000002 IOPAMIDOL 61 % SOLUTION: Performed by: COLON & RECTAL SURGERY

## 2022-04-12 PROCEDURE — 74177 CT ABD & PELVIS W/CONTRAST: CPT

## 2022-04-12 PROCEDURE — 0 DIATRIZOATE MEGLUMINE & SODIUM PER 1 ML: Performed by: COLON & RECTAL SURGERY

## 2022-04-12 RX ADMIN — IOPAMIDOL 85 ML: 612 INJECTION, SOLUTION INTRAVENOUS at 09:39

## 2022-04-12 RX ADMIN — DIATRIZOATE MEGLUMINE AND DIATRIZOATE SODIUM 30 ML: 660; 100 LIQUID ORAL; RECTAL at 08:36

## 2022-04-19 ENCOUNTER — OFFICE (OUTPATIENT)
Dept: URBAN - METROPOLITAN AREA CLINIC 66 | Facility: CLINIC | Age: 57
End: 2022-04-19

## 2022-04-19 VITALS
HEIGHT: 63 IN | DIASTOLIC BLOOD PRESSURE: 70 MMHG | HEART RATE: 88 BPM | WEIGHT: 143 LBS | SYSTOLIC BLOOD PRESSURE: 106 MMHG

## 2022-04-19 DIAGNOSIS — Z93.2 ILEOSTOMY STATUS: ICD-10-CM

## 2022-04-19 PROCEDURE — 99214 OFFICE O/P EST MOD 30 MIN: CPT | Performed by: INTERNAL MEDICINE

## 2022-07-08 ENCOUNTER — PROCEDURE VISIT (OUTPATIENT)
Dept: OBSTETRICS AND GYNECOLOGY | Age: 57
End: 2022-07-08

## 2022-07-08 ENCOUNTER — OFFICE VISIT (OUTPATIENT)
Dept: OBSTETRICS AND GYNECOLOGY | Age: 57
End: 2022-07-08

## 2022-07-08 ENCOUNTER — APPOINTMENT (OUTPATIENT)
Dept: WOMENS IMAGING | Facility: HOSPITAL | Age: 57
End: 2022-07-08

## 2022-07-08 VITALS
WEIGHT: 143 LBS | BODY MASS INDEX: 24.41 KG/M2 | SYSTOLIC BLOOD PRESSURE: 116 MMHG | HEIGHT: 64 IN | DIASTOLIC BLOOD PRESSURE: 72 MMHG

## 2022-07-08 DIAGNOSIS — Z01.419 WELL FEMALE EXAM WITH ROUTINE GYNECOLOGICAL EXAM: Primary | ICD-10-CM

## 2022-07-08 DIAGNOSIS — M85.80 OSTEOPENIA, SENILE: ICD-10-CM

## 2022-07-08 DIAGNOSIS — Z93.2 ILEOSTOMY IN PLACE: ICD-10-CM

## 2022-07-08 DIAGNOSIS — K50.013 CROHN'S DISEASE OF SMALL INTESTINE WITH FISTULA: ICD-10-CM

## 2022-07-08 DIAGNOSIS — I10 ESSENTIAL HYPERTENSION: ICD-10-CM

## 2022-07-08 DIAGNOSIS — Z12.31 VISIT FOR SCREENING MAMMOGRAM: Primary | ICD-10-CM

## 2022-07-08 DIAGNOSIS — Z13.89 SCREENING FOR BLOOD OR PROTEIN IN URINE: ICD-10-CM

## 2022-07-08 DIAGNOSIS — Z78.0 POST-MENOPAUSAL: ICD-10-CM

## 2022-07-08 DIAGNOSIS — Z13.21 ENCOUNTER FOR VITAMIN DEFICIENCY SCREENING: ICD-10-CM

## 2022-07-08 PROBLEM — N17.9 AKI (ACUTE KIDNEY INJURY): Status: RESOLVED | Noted: 2019-02-15 | Resolved: 2022-07-08

## 2022-07-08 PROCEDURE — 99396 PREV VISIT EST AGE 40-64: CPT | Performed by: OBSTETRICS & GYNECOLOGY

## 2022-07-08 PROCEDURE — 81002 URINALYSIS NONAUTO W/O SCOPE: CPT | Performed by: OBSTETRICS & GYNECOLOGY

## 2022-07-08 PROCEDURE — 77063 BREAST TOMOSYNTHESIS BI: CPT | Performed by: OBSTETRICS & GYNECOLOGY

## 2022-07-08 PROCEDURE — 77063 BREAST TOMOSYNTHESIS BI: CPT | Performed by: RADIOLOGY

## 2022-07-08 PROCEDURE — 77067 SCR MAMMO BI INCL CAD: CPT | Performed by: RADIOLOGY

## 2022-07-08 PROCEDURE — 77080 DXA BONE DENSITY AXIAL: CPT | Performed by: OBSTETRICS & GYNECOLOGY

## 2022-07-08 PROCEDURE — 77067 SCR MAMMO BI INCL CAD: CPT | Performed by: OBSTETRICS & GYNECOLOGY

## 2022-07-08 RX ORDER — PROCHLORPERAZINE MALEATE 10 MG
TABLET ORAL
COMMUNITY
Start: 2022-06-24

## 2022-07-08 NOTE — PROGRESS NOTES
[facility]   Obstetrics and Gynecology       2022    Patient: Gustavo Hernandez          MR#:[id number]    History of Present Illness    Chief Complaint   Patient presents with   • Gynecologic Exam     last pap 2018 neg (hyst), mg today        56 y.o. female  who presents for annual exam.    The patient presents feeling well without any complaints.  She suspected that she had a recurrent rectovaginal fistula but work-up was negative.    Routine DEXA scan today showed mild osteopenia    Studies reviewed:    No LMP recorded (lmp unknown). Patient has had a hysterectomy.  Obstetric History:  OB History        4    Para   4    Term   2            AB        Living   2       SAB        IAB        Ectopic        Molar        Multiple        Live Births   2               Menstrual History:     No LMP recorded (lmp unknown). Patient has had a hysterectomy.       Sexual History:       Social History     Substance and Sexual Activity   Sexual Activity Yes   • Partners: Male   • Birth control/protection: Surgical, Post-menopausal    Comment: hysterectomy , .       ________________________________________  Patient Active Problem List   Diagnosis   • Crohn's disease ([facility])   • Ileostomy in place ([facility])   • Vulvovaginal candidiasis   • Essential hypertension   • Family history of premature coronary heart disease   • Small bowel obstruction ([facility])   • Migraine without status migrainosus, not intractable   • Hypertriglyceridemia   • Arthralgia of right hand   • Osteopenia, senile     Past Medical History:   Diagnosis Date   • Abnormal cortisol level 2019   • Abnormal serum thyroxine (T4) level 2020   • Actinic keratosis     FOLLOWED BY DR. DEEDEE LUTHER   • HASMUKH (acute kidney injury) ([facility]) 2021   • Allergic rhinitis    • Anxiety    • Asthma    • Chest pain 2021   • Chronic sinusitis    • Complex cyst of uterine adnexa 2002   • COPD (chronic obstructive pulmonary disease)  ([facility])    • COVID-19 virus infection 2022   • Crohn's disease ([facility])    • Dehydration 2015   • Depression    • Diplopia 2018   • Esophageal reflux    • Excessive menstruation    • Exertional chest pain 2019    SEEN AT [facility]   • Gastroenteritis 2015    SEEN AT [facility] ER   • GERD (gastroesophageal reflux disease)    • Hair loss 2019   • Herpes    • History of MRSA infection    • History of transfusion    • Hypertension    • Hypertensive urgency 2018    SEEN AT [facility] ER   • Hypertriglyceridemia    • Hypokalemia 2018   • Hypomagnesemia 2018   • Ileostomy present ([facility])    • Impacted cerumen    • Influenza 2016   • Influenza A 2019   • Insomnia 10/2018   • Intestinal malabsorption    • Iron deficiency anemia    • Kidney stones    • Leukocytosis 2015   • Macrocytosis    • Microhematuria 2013    FOLLOWED BY DR. KULWINDER SANCHEZ   • Migraines    • Pagophagia 2019   • Peptic ulceration    • Peritoneal adhesions    • Proctosigmoiditis 2001    REFRACTORY, ADMITTED TO [facility]   • SBO (small bowel obstruction) ([facility]) 10/07/2019    ADMITTED TO [facility]   • SBO (small bowel obstruction) ([facility]) 2014    ADMITTED TO [facility]   • SBO (small bowel obstruction) ([facility]) 2003   • SBO (small bowel obstruction) ([facility]) 2019    SEEN AT [facility] ER   • Seborrheic keratosis     FOLLOWED BY DR. DEEDEE LUTHER   • Toxic colitis 1999    ADMITTED TO [facility] IN [place]   • Ulcerative colitis ([facility])    • Vagabond's disease 2019   • Vaginal atrophy    • Vulvovaginal candidiasis 2021     Social History     Tobacco Use   Smoking Status Former Smoker   • Packs/day: 0.25   • Types: Cigarettes   • Quit date:    • Years since quittin.5   Smokeless Tobacco Former User   • Types: Chew   • Quit date:      Family History   Problem Relation Age of Onset   • Mental illness Mother    • Heart disease Mother    •  Diabetes Mother    • Hypertension Mother    • Arthritis Mother         RHEUMATOID   • Colon polyps Mother    • Rheum arthritis Mother    • Asthma Father    • Heart disease Father    • Hypertension Father    • Stroke Father    • Alcohol abuse Brother    • Arthritis Brother    • Osteoporosis Maternal Aunt    • Colon cancer Paternal Aunt    • Cancer Paternal Aunt    • Osteoporosis Maternal Grandmother    • Cancer Paternal Grandmother    • Colon cancer Paternal Grandmother         unsure of age   • Breast cancer Neg Hx    • Ovarian cancer Neg Hx    • Uterine cancer Neg Hx    • Deep vein thrombosis Neg Hx    • Pulmonary embolism Neg Hx    • Melanoma Neg Hx    • Prostate cancer Neg Hx      Prior to Admission medications    Medication Sig Start Date End Date Taking? Authorizing Provider   acyclovir (ZOVIRAX) 400 MG tablet TAKE ONE TABLET BY MOUTH TWICE DAILY FOR FIVE DAYS FOR outbreak AS NEEDED. CALL [facility] FOR REFILL 4/14/21  Yes ProviderCristina MD   ALPRAZolam (NIRAVAM) 1 MG disintegrating tablet Place 1 mg on the tongue At Night As Needed for Anxiety.   Yes Provider, MD Cristina   cetirizine (zyrTEC) 10 MG tablet Take 10 mg by mouth Daily. 2/18/22  Yes Provider, MD Cristina   eletriptan (RELPAX) 40 MG tablet Take one tablet by mouth at onset of migraine. May repeat in 2 hours if needed. Do not exceed 2 tablets in 24 hours. 10/18/18  Yes Chao Salas APRN   Emgality 120 MG/ML auto-injector pen  5/12/21  Yes Provider, MD Cristina   EPINEPHrine (EPIPEN) 0.3 MG/0.3ML solution auto-injector injection  12/5/17  Yes Provider, MD Cristina   estradiol (ESTRACE) 1 MG tablet Take 1 mg by mouth Daily. 2/11/22 2/11/23 Yes Provider, MD Cristina   lisinopril (PRINIVIL,ZESTRIL) 10 MG tablet TAKE 1 TABLET BY MOUTH ONCE DAILY FOR 30 DAYS 7/8/19  Yes Provider, MD Cristina   Multiple Vitamins-Minerals (MULTIVITAMIN PO) Take 1 tablet by mouth Daily.   Yes Provider, MD Cristina   omeprazole (PriLOSEC) 40  MG capsule Take 40 mg by mouth Daily. 10/3/16  Yes ProviderCristina MD   ondansetron ODT (ZOFRAN-ODT) 8 MG disintegrating tablet TAKE ONE TABLET BY MOUTH AND allow TO dissolve ONCE DAILY AS NEEDED. CALL [facility] FOR REFILL 21  Yes Cristina Norris MD   prochlorperazine (COMPAZINE) 10 MG tablet TAKE ONE TABLET BY MOUTH THREE TIMES DAILY AS NEEDED CALL [facility] FOR REFILL 22  Yes Cristina Norris MD   rizatriptan (MAXALT) 10 MG tablet Take 1 tablet by mouth 1 (One) Time As Needed for Migraine. May repeat in 2 hours if needed 10/18/18  Yes Chao Salas APRN   simethicone (MYLICON) 80 MG chewable tablet  22  Yes Cristina Norris MD   valACYclovir (VALTREX) 500 MG tablet Take 500 mg by mouth Daily. 22 Yes ProviderCristina MD   Cetirizine HCl (ZYRTEC ALLERGY PO) Take 10 mg by mouth Daily.  22  Cristina Norris MD   fluticasone (FLONASE) 50 MCG/ACT nasal spray 2 sprays into each nostril Daily.  22  Cristina Norris MD   predniSONE (DELTASONE) 50 MG tablet Take one pill 13 hrs pre CT, one pill 7 hrs pre CT and one pill 1 hr pre CT 3/18/22 7/8/22  Yulia Dominguez MD     Past Surgical History:   Procedure Laterality Date   • APPENDECTOMY N/A 2001    DR. MARY KATE NEWELL  AT [facility]   •  SECTION N/A 1988   •  SECTION N/A 1993   •  SECTION     • COLECTOMY TOTAL N/A 2001    Total abdominal colectomy with J-pouch with diverting loop ileostomy AND APPENDECTOMY, DR. MARY KATE NEWELL AT [facility]   • COLON SURGERY  2019    small bowel resection.   • COLONOSCOPY N/A 2001    PROCTOSIGMOIDITIS, PSEUDOPOLYP AT 20 CM, DR. JOHNNY HOLCOMB AT [facility]   • CYSTOSCOPY RETROGRADE PYELOGRAM Left 2013    Dr. Cr Yee AT [facility]   • D & C HYSTEROSCOPY N/A 2013    FOCAL CYSTIC ATROPHY, ECC BENIGN, DR. VISH CROWDER  AT [facility] [place] [facility]   •  ENDOSCOPY N/A 06/28/2019    SMALL BOWEL ENTEROSCOPY, EDEMA IN STOMACH, CHRONIC GASTRITIS, PATH: MILD REACTIVE/CHEMICAL GASTROPATHY, DR. RUBEN MANCUSO AT [facility]   • FLEXIBLE SIGMOIDOSCOPY N/A 02/06/1997    (+) IBD, PATH: ACTIVE COLITIS, DR. JOHNNY HOLCOMB AT [facility]   • HEMORRHOIDECTOMY  2006   • HYSTERECTOMY N/A 08/28/2003    WITH BSO, DR. VISH CROWDER AT [facility]   • ILEOSCOPY N/A 01/10/2002    WITH ANAL DILATION, (+) SEVERE POUCHITIS, DR. MARY KATE NEWELL  AT [facility]   • ILEOSTOMY CLOSURE N/A 07/30/2001    DR. MARY KATE NEWELL AT [facility]   • ILEOSTOMY REVISION N/A 01/25/2002    J POUCH REMOVAL WITH PERMANENT END ILEOSTOMYBURKE ILEOSTOMY CREATION, DR. MARY KATE NEWELL  AT [facility]   • LAPAROSCOPIC CHOLECYSTECTOMY N/A 09/03/2010    w/ cholangiogram and adhesiolysis-Dr. Mary Kate Avila AT [facility]   • LAPAROSCOPIC LYSIS OF ADHESIONS N/A 08/28/2003    EXPLORATORY LAPAROSCOPY WITH LYSIS, DR. MARY KATE NEWELL  AT [facility]   • PELVIC LAPAROSCOPY N/A 01/31/2019    EXPLORATORY LAPAROSCOPY WITH PELVIC MASS EXCISION, DR. DOC BECERRIL AT [facility]   • PERIPHERALLY INSERTED CENTRAL CATHETER INSERTION Right 02/15/2019    RIGHT SUBCLAVIAN, DR. CHARLES MARAVILLA AT [facility]   • POUCHOSCOPY N/A 07/26/2001    POUCHOGRAM, WNL, DR. MARY KATE NEWELL  AT [facility]   • SALPINGO OOPHORECTOMY Bilateral 08/28/2003    DR. VISH CROWDER  AT [facility]   • UMBILICAL HERNIA REPAIR N/A 2000   • UPPER GASTROINTESTINAL ENDOSCOPY N/A 09/01/2011    Normal esophagus, normal stomach, erythematous duodenopathy-Dr. Jai Khan   • UPPER GASTROINTESTINAL ENDOSCOPY N/A 05/20/2010    Normal esophagus, normal stomach, normal duodneum-Dr. Jai Khan AT [facility]     ________________________________________    Current contraception: status post hysterectomy  History of abnormal Pap smear: no  Family history of uterine or ovarian cancer:  "no  Family History of colon cancer/colon polyps: no  History of abnormal mammogram: no  History of abnormal lipids: no    The following portions of the patient's history were reviewed and updated as appropriate: allergies, current medications, past family history, past medical history, past social history, past surgical history and problem list.    Review of Systems    Pertinent items are noted in HPI.       Objective   Physical Exam    /72   Ht 162.6 cm (64\")   Wt 64.9 kg (143 lb)   LMP  (LMP Unknown)   Breastfeeding No   BMI 24.55 kg/m²    BP Readings from Last 3 Encounters:   07/08/22 116/72   03/18/22 112/84   06/25/21 114/70      Wt Readings from Last 3 Encounters:   07/08/22 64.9 kg (143 lb)   03/18/22 64.2 kg (141 lb 9.6 oz)   06/25/21 61.7 kg (136 lb)        BMI: Estimated body mass index is 24.55 kg/m² as calculated from the following:    Height as of this encounter: 162.6 cm (64\").    Weight as of this encounter: 64.9 kg (143 lb).       General: alert, appears stated age and cooperative   Heart: regular rate and rhythm, S1, S2 normal, no murmur, click, rub or gallop   Lungs: clear to auscultation bilaterally   Abdomen: Ostomy in place and appears healthy  and soft, non-tender, without masses, no organomegaly   Breast: inspection negative, no nipple discharge or bleeding, no masses or nodularity palpable   External genitalia/Vulva: External genitalia including bartholin's glands, Urethra, Paris's gland and urethra meatus are normal, Perineum, rectum and anus appear normal  and Bladder appears normal without significant prolapse    Vagina: normal mucosa, normal discharge   Cervix: absent   Uterus: normal size, mobile and non-tender   Adnexa: exam limited by habitus     As part of wellness and prevention, the following topics were discussed with the patient:  Encouraged self breast exam  Physical activity and regular exercised encouraged.     Assessment:  Diagnoses and all orders for this " visit:    1. Well female exam with routine gynecological exam (Primary)    2. Screening for blood or protein in urine  -     POC Urinalysis Dipstick    3. Crohn's disease of small intestine with fistula (HCC)    4. Essential hypertension    5. Ileostomy in place (HCC)    6. Osteopenia, senile  -     Vitamin D 25 Hydroxy    7. Encounter for vitamin deficiency screening  -     Vitamin D 25 Hydroxy        Plan:  Return in about 1 year (around 7/8/2023) for Annual GYN exam.    Burak Morley MD  7/8/2022 12:25 EDT

## 2022-07-09 LAB — 25(OH)D3+25(OH)D2 SERPL-MCNC: 35.9 NG/ML (ref 30–100)

## 2022-07-10 NOTE — PROGRESS NOTES
Vitamin D is in low normal range.  Supplement is suggest.  OTC Vit D 5,000 unit weekly is suggested.   The vitamin is fat soluble so it does not have to be taken daily.    You can  the medicine OTC.  You don't need an Prescription dose

## 2022-07-16 DIAGNOSIS — R92.8 ABNORMALITY OF LEFT BREAST ON SCREENING MAMMOGRAM: Primary | ICD-10-CM

## 2022-07-18 DIAGNOSIS — R92.8 ABNORMALITY OF LEFT BREAST ON SCREENING MAMMOGRAM: Primary | ICD-10-CM

## 2022-08-01 ENCOUNTER — APPOINTMENT (OUTPATIENT)
Dept: WOMENS IMAGING | Facility: HOSPITAL | Age: 57
End: 2022-08-01

## 2022-08-01 PROCEDURE — 77061 BREAST TOMOSYNTHESIS UNI: CPT | Performed by: RADIOLOGY

## 2022-08-01 PROCEDURE — G0279 TOMOSYNTHESIS, MAMMO: HCPCS | Performed by: RADIOLOGY

## 2022-08-01 PROCEDURE — 77065 DX MAMMO INCL CAD UNI: CPT | Performed by: RADIOLOGY

## 2022-08-01 PROCEDURE — 76641 ULTRASOUND BREAST COMPLETE: CPT | Performed by: RADIOLOGY

## 2022-08-03 DIAGNOSIS — R92.8 ABNORMALITY OF LEFT BREAST ON SCREENING MAMMOGRAM: Primary | ICD-10-CM

## 2022-08-09 ENCOUNTER — APPOINTMENT (OUTPATIENT)
Dept: WOMENS IMAGING | Facility: HOSPITAL | Age: 57
End: 2022-08-09

## 2022-08-09 PROCEDURE — A4648 IMPLANTABLE TISSUE MARKER: HCPCS | Performed by: RADIOLOGY

## 2022-08-09 PROCEDURE — 19083 BX BREAST 1ST LESION US IMAG: CPT | Performed by: RADIOLOGY

## 2022-08-11 DIAGNOSIS — R92.8 ABNORMALITY OF LEFT BREAST ON SCREENING MAMMOGRAM: Primary | ICD-10-CM

## 2022-11-01 ENCOUNTER — OFFICE (OUTPATIENT)
Dept: URBAN - METROPOLITAN AREA CLINIC 66 | Facility: CLINIC | Age: 57
End: 2022-11-01

## 2022-11-01 VITALS
SYSTOLIC BLOOD PRESSURE: 118 MMHG | WEIGHT: 142 LBS | HEIGHT: 63 IN | DIASTOLIC BLOOD PRESSURE: 70 MMHG | HEART RATE: 84 BPM

## 2022-11-01 DIAGNOSIS — A04.9 BACTERIAL INTESTINAL INFECTION, UNSPECIFIED: ICD-10-CM

## 2022-11-01 DIAGNOSIS — Z93.2 ILEOSTOMY STATUS: ICD-10-CM

## 2022-11-01 PROCEDURE — 99214 OFFICE O/P EST MOD 30 MIN: CPT | Performed by: INTERNAL MEDICINE

## 2022-11-03 ENCOUNTER — TRANSCRIBE ORDERS (OUTPATIENT)
Dept: WOUND CARE | Facility: HOSPITAL | Age: 57
End: 2022-11-03

## 2022-11-03 DIAGNOSIS — Z71.89 OSTOMY NURSE CONSULTATION: Primary | ICD-10-CM

## 2022-11-04 ENCOUNTER — HOSPITAL ENCOUNTER (OUTPATIENT)
Dept: WOUND CARE | Facility: HOSPITAL | Age: 57
Discharge: HOME | End: 2022-11-04

## 2022-11-04 NOTE — NURSING NOTE
CWON note: pt seen for evaluation of rash to peristomal skin. Pt has had her ileostomy for 22 years, manages independently, and has used the same Convatec appliance with paste and barrier ring without difficulty until July 2022. At that time, she developed a yeast rash to her upper trunk (and around her ostomy) which was treated with oral and topical antifungal agents.  Upon completion of treatment,  she got a bacterial infection, which was treated with oral and topical antibiotics. The skin rash to her upper trunk has cleared, but it persists under the tape barrier of her ostomy. Her dermatologist refuses to treat the skin around her stoma;  pt has not had any skin scrapings or biopsies of the area done. The area appears blistered with dry crusting noted. It has not responded to any antifungal powder, antibiotic ointment, or steroid creams that have been tried to date.       This does not appear to be a pouching issue or allergic reaction to the pouching products used. Appears to be more like a fungal or bacterial infection. Would suggest to try Magic Barrier cream, as it has all the components needed to treat this type of skin condition. We discussed calling MD office to obtain an RX for this, to try this for one week and if no improvement to call  Dr Dominguez to see if she would biopsy the skin to determine definitive etiology and treatment, since pt's dermatologist will not see her for this condition. Pt should call C office and return to the OP clinic for any pouching issues.

## 2022-11-08 ENCOUNTER — OFFICE VISIT (OUTPATIENT)
Dept: SURGERY | Facility: CLINIC | Age: 57
End: 2022-11-08

## 2022-11-08 VITALS
DIASTOLIC BLOOD PRESSURE: 60 MMHG | BODY MASS INDEX: 25.23 KG/M2 | OXYGEN SATURATION: 98 % | TEMPERATURE: 97.5 F | HEIGHT: 63 IN | HEART RATE: 88 BPM | WEIGHT: 142.4 LBS | SYSTOLIC BLOOD PRESSURE: 96 MMHG

## 2022-11-08 DIAGNOSIS — L30.9 PERISTOMAL DERMATITIS: Primary | ICD-10-CM

## 2022-11-08 PROBLEM — E55.9 VITAMIN D DEFICIENCY: Status: ACTIVE | Noted: 2022-08-22

## 2022-11-08 PROBLEM — N95.1 MENOPAUSAL FLUSHING: Status: ACTIVE | Noted: 2022-11-08

## 2022-11-08 PROBLEM — J32.9 SINUSITIS: Status: ACTIVE | Noted: 2022-11-08

## 2022-11-08 PROBLEM — K21.9 GASTROESOPHAGEAL REFLUX DISEASE: Status: ACTIVE | Noted: 2022-11-08

## 2022-11-08 PROBLEM — J30.2 SEASONAL ALLERGIES: Status: ACTIVE | Noted: 2022-11-08

## 2022-11-08 PROCEDURE — 99215 OFFICE O/P EST HI 40 MIN: CPT | Performed by: PHYSICIAN ASSISTANT

## 2022-11-08 RX ORDER — CLINDAMYCIN PHOSPHATE 10 UG/ML
LOTION TOPICAL
COMMUNITY
Start: 2022-08-11

## 2022-11-08 RX ORDER — TERBINAFINE HYDROCHLORIDE 250 MG/1
250 TABLET ORAL DAILY
COMMUNITY
Start: 2022-11-01 | End: 2022-11-08 | Stop reason: SDUPTHER

## 2022-11-08 RX ORDER — ALPRAZOLAM 1 MG/1
1 TABLET, ORALLY DISINTEGRATING ORAL
COMMUNITY
Start: 2022-08-22 | End: 2022-11-08 | Stop reason: SDUPTHER

## 2022-11-08 RX ORDER — CHLORHEXIDINE GLUCONATE 213 G/1000ML
SOLUTION TOPICAL
COMMUNITY
Start: 2022-10-28 | End: 2022-11-10

## 2022-11-08 RX ORDER — FLUCONAZOLE 150 MG/1
TABLET ORAL
COMMUNITY
Start: 2022-10-28 | End: 2022-11-08

## 2022-11-08 RX ORDER — HYDROXYZINE HYDROCHLORIDE 25 MG/1
TABLET, FILM COATED ORAL
COMMUNITY
Start: 2022-08-11

## 2022-11-08 RX ORDER — ESTRADIOL 1 MG/1
TABLET ORAL
COMMUNITY
Start: 2022-09-20 | End: 2022-11-08 | Stop reason: SDUPTHER

## 2022-11-08 RX ORDER — COVID-19 ANTIGEN TEST
KIT MISCELLANEOUS
COMMUNITY
Start: 2022-10-12 | End: 2022-11-08

## 2022-11-08 RX ORDER — ANTISEPTIC SURGICAL SCRUB 0.04 MG/ML
SOLUTION TOPICAL
COMMUNITY
Start: 2022-10-31 | End: 2022-11-10

## 2022-11-08 RX ORDER — OMEPRAZOLE 40 MG/1
CAPSULE, DELAYED RELEASE ORAL EVERY 24 HOURS
COMMUNITY
End: 2022-11-08 | Stop reason: SDUPTHER

## 2022-11-08 RX ORDER — LISINOPRIL 10 MG/1
TABLET ORAL EVERY 24 HOURS
COMMUNITY
End: 2022-11-08 | Stop reason: SDUPTHER

## 2022-11-08 RX ORDER — CETIRIZINE HYDROCHLORIDE 10 MG/1
TABLET ORAL EVERY 24 HOURS
COMMUNITY
End: 2022-11-08 | Stop reason: SDUPTHER

## 2022-11-08 RX ORDER — CALCIUM CARBONATE 200(500)MG
TABLET,CHEWABLE ORAL EVERY 24 HOURS
COMMUNITY
Start: 2022-07-08 | End: 2023-01-03

## 2022-11-08 RX ORDER — ACYCLOVIR 400 MG/1
TABLET ORAL
COMMUNITY

## 2022-11-08 RX ORDER — FLUCONAZOLE 150 MG/1
TABLET ORAL
COMMUNITY
End: 2022-11-08 | Stop reason: SDUPTHER

## 2022-11-08 RX ORDER — ALPRAZOLAM 2 MG/1
TABLET ORAL
COMMUNITY
End: 2022-11-08

## 2022-11-08 RX ORDER — VALACYCLOVIR HYDROCHLORIDE 500 MG/1
TABLET, FILM COATED ORAL EVERY 24 HOURS
COMMUNITY
End: 2022-11-08

## 2022-11-08 RX ORDER — PSEUDOEPHEDRINE HCL 30 MG
TABLET ORAL EVERY 6 HOURS SCHEDULED
COMMUNITY
Start: 2022-07-01

## 2022-11-08 RX ORDER — TRIAMCINOLONE ACETONIDE 5 MG/G
CREAM TOPICAL
COMMUNITY
Start: 2022-08-01 | End: 2022-11-18

## 2022-11-08 RX ORDER — DOXYCYCLINE HYCLATE 100 MG
TABLET ORAL
COMMUNITY
Start: 2022-08-16 | End: 2022-11-08

## 2022-11-08 RX ORDER — FLUCONAZOLE 200 MG/1
200 TABLET ORAL DAILY
COMMUNITY
Start: 2022-10-31 | End: 2022-11-08

## 2022-11-08 RX ORDER — EPINEPHRINE 0.3 MG/.3ML
INJECTION SUBCUTANEOUS
COMMUNITY

## 2022-11-08 RX ORDER — TERBINAFINE HYDROCHLORIDE 250 MG/1
250 TABLET ORAL DAILY
Qty: 4 TABLET | Refills: 0 | Status: SHIPPED | OUTPATIENT
Start: 2022-11-08 | End: 2022-11-12

## 2022-11-08 RX ORDER — ESTRADIOL 1 MG/1
TABLET ORAL EVERY 24 HOURS
COMMUNITY
End: 2022-11-08 | Stop reason: SDUPTHER

## 2022-11-08 NOTE — PROGRESS NOTES
"Gustavo Hernandez is a 56 y.o. female in for follow up of peristomal skin disorder.    In July of this year, the patient noticed a rash on her chest. She was treated by her dermatologist for a yeast infection. They also performed a culture that was positive for MRSA. The rash on her chest subsequently resolved.     Around the end of September of this year, she noticed her skin breaking out around her stoma, which has been in place for 22 years.  She had never had peristomal skin problems before.  The peristomal skin irritation appeared similar to the rash that had been on her chest in July.    She has tried oral doxycycline and prednisone, as well as topical triamcinolone, nystatin, clindamycin, and miconazole without relief. Dr. Woodard prescribed terbinafine on Tuesday, which helped somewhat, though now she notes a couple of small spots of skin irritation possibly spreading toward the pubic region. The patient's wound care nurse recommended magic barrier cream, which Dr. Woodard tried to prescribe, but the pharmacy told the patient that it was an over the counter medication and that they did not have it in stock. The patient states that Dr. Woodard also mentioned the possibility of these skin changes being a manifestation of Chron's disease, but he did not say any more than that.     The patient states that her ongoing symptoms that will not heal despite all of the treatments she has tried is driving her crazy. Though there has been some improvement with terbinafine treatment, the patient is not convinced that this treatment will ultimately be successful.     BP 96/60 (BP Location: Left arm, Patient Position: Sitting, Cuff Size: Small Adult)   Pulse 88   Temp 97.5 °F (36.4 °C) (Temporal)   Ht 160 cm (63\")   Wt 64.6 kg (142 lb 6.4 oz)   LMP  (LMP Unknown)   SpO2 98%   Breastfeeding No   BMI 25.23 kg/m²   Body mass index is 25.23 kg/m².  -  Physical Exam  No acute distress  Chaperone present  Skin: several dry healing " crusted patches noted around the right sided abdominal stoma. When compared to the clinical photograph from 11/4/22, the lesions today are markedly less red and appear to be dry and healing. There are 2 small light pink areas of skin irritation in the suprapubic region.     Assessment:   1. Peristomal dermatitis       Plan:  • I extended the course of terbinafine treatment to a total of 2 weeks, as it seems to have been effective.   • I recommend continuing the application of nystatin powder and possibly trial of barrier cream  • The patient will follow up with Dr. Dominguez on 11/14/22 for further evaluation of the possibility of Chron's dermatitis and consideration of injectible steroid treatment if current treatment regimen fails.     Time Spent: I spent >40 minutes caring for Gustavo on this date of service. This time includes time spent by me in the following activities: preparing for the visit, performing a medically appropriate examination and/or evaluation, counseling and educating the patient/family/caregiver, ordering medications, tests, or procedures, documenting information in the medical record and care coordination.     Elizabeth Knapp PA-C  Physician Assistant  Colorectal Surgery

## 2022-11-14 ENCOUNTER — TELEPHONE (OUTPATIENT)
Dept: SURGERY | Facility: CLINIC | Age: 57
End: 2022-11-14

## 2022-11-14 NOTE — TELEPHONE ENCOUNTER
Caller: ELEAZAR LEE    Relationship to patient: SELF    Best call back number: [phone]    Patient is needing: PT IS CALLING REGARDING A TEXT MESSAGE ABOUT TODAYS APPT NEEDING TO BE RESCHEDULED.    UNABLE TO WARM TRANSFER- OKAY TO [facility]

## 2022-11-18 ENCOUNTER — OFFICE VISIT (OUTPATIENT)
Dept: SURGERY | Facility: CLINIC | Age: 57
End: 2022-11-18

## 2022-11-18 VITALS
WEIGHT: 140.8 LBS | DIASTOLIC BLOOD PRESSURE: 76 MMHG | SYSTOLIC BLOOD PRESSURE: 110 MMHG | HEART RATE: 89 BPM | HEIGHT: 63 IN | BODY MASS INDEX: 24.95 KG/M2 | OXYGEN SATURATION: 100 %

## 2022-11-18 DIAGNOSIS — L30.9 PERISTOMAL DERMATITIS: Primary | ICD-10-CM

## 2022-11-18 PROCEDURE — 99213 OFFICE O/P EST LOW 20 MIN: CPT | Performed by: COLON & RECTAL SURGERY

## 2022-11-18 RX ORDER — TRIAMCINOLONE ACETONIDE 40 MG/ML
20 INJECTION, SUSPENSION INTRA-ARTICULAR; INTRAMUSCULAR ONCE
Status: COMPLETED | OUTPATIENT
Start: 2022-11-18 | End: 2022-11-18

## 2022-11-18 RX ORDER — CLOBETASOL PROPIONATE 0.5 MG/G
1 CREAM TOPICAL DAILY
Qty: 30 G | Refills: 1 | Status: SHIPPED | OUTPATIENT
Start: 2022-11-18

## 2022-11-18 RX ADMIN — TRIAMCINOLONE ACETONIDE 20 MG: 40 INJECTION, SUSPENSION INTRA-ARTICULAR; INTRAMUSCULAR at 10:40

## 2022-12-16 ENCOUNTER — OFFICE VISIT (OUTPATIENT)
Dept: SURGERY | Facility: CLINIC | Age: 57
End: 2022-12-16
Payer: COMMERCIAL

## 2022-12-16 VITALS
BODY MASS INDEX: 24.36 KG/M2 | TEMPERATURE: 97.9 F | SYSTOLIC BLOOD PRESSURE: 110 MMHG | WEIGHT: 137.5 LBS | HEART RATE: 79 BPM | OXYGEN SATURATION: 98 % | DIASTOLIC BLOOD PRESSURE: 88 MMHG | HEIGHT: 63 IN

## 2022-12-16 DIAGNOSIS — L30.9 PERISTOMAL DERMATITIS: Primary | ICD-10-CM

## 2022-12-16 PROCEDURE — 96372 THER/PROPH/DIAG INJ SC/IM: CPT | Performed by: COLON & RECTAL SURGERY

## 2022-12-16 PROCEDURE — 99213 OFFICE O/P EST LOW 20 MIN: CPT | Performed by: COLON & RECTAL SURGERY

## 2022-12-16 RX ORDER — ESTRADIOL 1 MG/1
TABLET ORAL
COMMUNITY
Start: 2022-12-12

## 2022-12-16 RX ORDER — DICYCLOMINE HYDROCHLORIDE 10 MG/1
10 CAPSULE ORAL 4 TIMES DAILY
Qty: 120 CAPSULE | Refills: 0 | Status: SHIPPED | OUTPATIENT
Start: 2022-12-16 | End: 2023-12-16

## 2022-12-16 RX ORDER — DIPHENHYDRAMINE HCL 25 MG
50 CAPSULE ORAL ONCE
Qty: 2 CAPSULE | Refills: 0 | Status: SHIPPED | OUTPATIENT
Start: 2022-12-16 | End: 2022-12-16

## 2022-12-16 RX ORDER — TRIAMCINOLONE ACETONIDE 40 MG/ML
40 INJECTION, SUSPENSION INTRA-ARTICULAR; INTRAMUSCULAR ONCE
Status: COMPLETED | OUTPATIENT
Start: 2022-12-16 | End: 2022-12-16

## 2022-12-16 RX ORDER — PREDNISONE 50 MG/1
TABLET ORAL
Qty: 3 TABLET | Refills: 0 | Status: SHIPPED | OUTPATIENT
Start: 2022-12-16 | End: 2023-01-09

## 2022-12-16 RX ADMIN — TRIAMCINOLONE ACETONIDE 40 MG: 40 INJECTION, SUSPENSION INTRA-ARTICULAR; INTRAMUSCULAR at 14:00

## 2022-12-16 NOTE — PROGRESS NOTES
Gustavo Hernandez is a 57 y.o. female in for follow up of Peristomal dermatitis    The patient reports she thought the wound was going to be cleared up about a week after her last visit, but now it has all come back. She states the spots that were lower are better, but it will never go completely away. The patient noted she has never been treated for psoriasis. She adds she is not using anything for Crohn's.    /88 (BP Location: Left arm, Patient Position: Sitting, Cuff Size: Small Adult)   Pulse 79   Temp 97.9 °F (36.6 °C) (Temporal)   Ht 160 cm (63\")   Wt 62.4 kg (137 lb 8 oz)   LMP  (LMP Unknown)   SpO2 98%   Breastfeeding No   BMI 24.36 kg/m²   Body mass index is 24.36 kg/m².      PE:  Physical Exam  Constitutional:       General: She is not in acute distress.     Appearance: She is well-developed.   HENT:      Head: Normocephalic and atraumatic.   Abdominal:      General: There is no distension.      Palpations: Abdomen is soft.      Comments: Parastomally there is wound left lateral and to the right of the stump and medially once less red and irritated in the left inferior lateral.    Musculoskeletal:         General: Normal range of motion.   Neurological:      Mental Status: She is alert.   Psychiatric:         Thought Content: Thought content normal.           Assessment:   1. Peristomal dermatitis         Plan:  I want to get a CT enterography because she has some complaints about intermittent possible obstructions and Bentyl for irritable bowel.    Procedure: These areas around the stoma are injected equally with 40 mg of triamcinolone diluted in 10 mL of saline .     Transcribed from ambient dictation for Yulia Dominguez MD by Marion Meléndez.  12/16/22   16:05 EST    Patient or patient representative verbalized consent to the visit recording.  I have personally performed the services described in this document as transcribed by the above individual, and it is both accurate and  complete.

## 2022-12-22 ENCOUNTER — HOSPITAL ENCOUNTER (OUTPATIENT)
Dept: CT IMAGING | Facility: HOSPITAL | Age: 57
Discharge: HOME | End: 2022-12-22
Admitting: COLON & RECTAL SURGERY

## 2022-12-22 DIAGNOSIS — L30.9 PERISTOMAL DERMATITIS: ICD-10-CM

## 2022-12-22 PROCEDURE — 0 IOPAMIDOL PER 1 ML: Performed by: COLON & RECTAL SURGERY

## 2022-12-22 PROCEDURE — 74177 CT ABD & PELVIS W/CONTRAST: CPT

## 2022-12-22 RX ADMIN — IOPAMIDOL 100 ML: 755 INJECTION, SOLUTION INTRAVENOUS at 11:36

## 2023-01-09 ENCOUNTER — OFFICE VISIT (OUTPATIENT)
Dept: SURGERY | Facility: CLINIC | Age: 58
End: 2023-01-09
Payer: COMMERCIAL

## 2023-01-09 VITALS
HEIGHT: 63 IN | SYSTOLIC BLOOD PRESSURE: 106 MMHG | HEART RATE: 88 BPM | BODY MASS INDEX: 24.98 KG/M2 | TEMPERATURE: 97.1 F | OXYGEN SATURATION: 98 % | DIASTOLIC BLOOD PRESSURE: 74 MMHG | WEIGHT: 141 LBS

## 2023-01-09 DIAGNOSIS — L30.9 PERISTOMAL DERMATITIS: Primary | ICD-10-CM

## 2023-01-09 PROCEDURE — 99212 OFFICE O/P EST SF 10 MIN: CPT | Performed by: COLON & RECTAL SURGERY

## 2023-01-09 RX ORDER — MEDICAL SUPPLY, MISCELLANEOUS
EACH MISCELLANEOUS
COMMUNITY
Start: 2022-12-16

## 2023-01-09 NOTE — PROGRESS NOTES
"Gustavo Hernandez is a 57 y.o. female in for follow up of Peristomal dermatitis    HPI:  The patient reports she is doing well. The spots on her stoma are looking better, but they are spreading and she continues to get new ones. She had a dermatologist, but they would not touch it because it is from her stoma area. The patient is going to the [facility] dermatologist in 03/2023. It started in 07/2022 and was almost completely gone on 09/2022, but then it returned and worsened.      /74 (BP Location: Left arm, Patient Position: Sitting, Cuff Size: Small Adult)   Pulse 88   Temp 97.1 °F (36.2 °C) (Temporal)   Ht 160 cm (63\")   Wt 64 kg (141 lb)   LMP  (LMP Unknown)   SpO2 98%   Breastfeeding No   BMI 24.98 kg/m²   Body mass index is 24.98 kg/m².      PE:  Physical Exam  Constitutional:       General: She is not in acute distress.     Appearance: She is well-developed.   HENT:      Head: Normocephalic and atraumatic.   Abdominal:      General: There is no distension.      Palpations: Abdomen is soft.      Comments: Plaque like erythematous <1 cm areas around stoma and lower abdomen   Musculoskeletal:         General: Normal range of motion.   Neurological:      Mental Status: She is alert.   Psychiatric:         Thought Content: Thought content normal.           Assessment:   1. Peristomal dermatitis         Plan:  The patient will continue to follow up with her dermatologist.    Transcribed from ambient dictation for Yulia Dominguez MD by Dale Hammond.  01/09/23   11:47 EST    Patient or patient representative verbalized consent to the visit recording.  I have personally performed the services described in this document as transcribed by the above individual, and it is both accurate and complete.          "

## 2023-02-09 ENCOUNTER — APPOINTMENT (OUTPATIENT)
Dept: WOMENS IMAGING | Facility: HOSPITAL | Age: 58
End: 2023-02-09
Payer: COMMERCIAL

## 2023-02-09 PROCEDURE — 76642 ULTRASOUND BREAST LIMITED: CPT | Performed by: RADIOLOGY

## 2023-02-13 DIAGNOSIS — Z12.31 SCREENING MAMMOGRAM, ENCOUNTER FOR: Primary | ICD-10-CM

## 2023-03-26 ENCOUNTER — TRANSCRIBE ORDERS (OUTPATIENT)
Dept: ADMINISTRATIVE | Facility: HOSPITAL | Age: 58
End: 2023-03-26
Payer: COMMERCIAL

## 2023-03-26 ENCOUNTER — LAB (OUTPATIENT)
Dept: LAB | Facility: HOSPITAL | Age: 58
End: 2023-03-26
Payer: COMMERCIAL

## 2023-03-26 DIAGNOSIS — A04.9: ICD-10-CM

## 2023-03-26 DIAGNOSIS — Z93.2 PRESENCE OF ILEOSTOMY: ICD-10-CM

## 2023-03-26 DIAGNOSIS — R11.0 NAUSEA: ICD-10-CM

## 2023-03-26 DIAGNOSIS — Z93.2 PRESENCE OF ILEOSTOMY: Primary | ICD-10-CM

## 2023-03-26 PROCEDURE — 87081 CULTURE SCREEN ONLY: CPT

## 2023-03-27 LAB — MRSA SPEC QL CULT: NORMAL

## 2023-05-16 ENCOUNTER — OFFICE (OUTPATIENT)
Dept: URBAN - METROPOLITAN AREA CLINIC 66 | Facility: CLINIC | Age: 58
End: 2023-05-16

## 2023-05-16 VITALS
HEIGHT: 63 IN | SYSTOLIC BLOOD PRESSURE: 128 MMHG | HEART RATE: 90 BPM | DIASTOLIC BLOOD PRESSURE: 79 MMHG | WEIGHT: 147 LBS

## 2023-05-16 DIAGNOSIS — A04.9 BACTERIAL INTESTINAL INFECTION, UNSPECIFIED: ICD-10-CM

## 2023-05-16 PROCEDURE — 99213 OFFICE O/P EST LOW 20 MIN: CPT | Performed by: INTERNAL MEDICINE

## 2023-07-21 ENCOUNTER — APPOINTMENT (OUTPATIENT)
Dept: WOMENS IMAGING | Facility: HOSPITAL | Age: 58
End: 2023-07-21
Payer: COMMERCIAL

## 2023-07-21 PROCEDURE — 77063 BREAST TOMOSYNTHESIS BI: CPT | Performed by: RADIOLOGY

## 2023-07-21 PROCEDURE — 77067 SCR MAMMO BI INCL CAD: CPT | Performed by: RADIOLOGY

## 2023-08-11 ENCOUNTER — OFFICE VISIT (OUTPATIENT)
Dept: OBSTETRICS AND GYNECOLOGY | Age: 58
End: 2023-08-11
Payer: COMMERCIAL

## 2023-08-11 VITALS
WEIGHT: 147 LBS | BODY MASS INDEX: 26.05 KG/M2 | SYSTOLIC BLOOD PRESSURE: 120 MMHG | HEIGHT: 63 IN | DIASTOLIC BLOOD PRESSURE: 64 MMHG

## 2023-08-11 DIAGNOSIS — K50.013 CROHN'S DISEASE OF SMALL INTESTINE WITH FISTULA: ICD-10-CM

## 2023-08-11 DIAGNOSIS — Z93.2 ILEOSTOMY IN PLACE: ICD-10-CM

## 2023-08-11 DIAGNOSIS — I10 ESSENTIAL HYPERTENSION: ICD-10-CM

## 2023-08-11 DIAGNOSIS — Z13.89 SCREENING FOR BLOOD OR PROTEIN IN URINE: ICD-10-CM

## 2023-08-11 DIAGNOSIS — Z01.419 WELL FEMALE EXAM WITH ROUTINE GYNECOLOGICAL EXAM: Primary | ICD-10-CM

## 2023-08-11 PROBLEM — J32.9 SINUSITIS: Status: RESOLVED | Noted: 2022-11-08 | Resolved: 2023-08-11

## 2023-08-11 PROBLEM — N95.1 MENOPAUSAL FLUSHING: Status: RESOLVED | Noted: 2022-11-08 | Resolved: 2023-08-11

## 2023-08-11 PROBLEM — K21.9 GASTROESOPHAGEAL REFLUX DISEASE: Status: RESOLVED | Noted: 2022-11-08 | Resolved: 2023-08-11

## 2023-08-11 PROCEDURE — 99396 PREV VISIT EST AGE 40-64: CPT | Performed by: OBSTETRICS & GYNECOLOGY

## 2023-08-11 RX ORDER — DEXTROMETHORPHAN HYDROBROMIDE AND PROMETHAZINE HYDROCHLORIDE 15; 6.25 MG/5ML; MG/5ML
SYRUP ORAL
COMMUNITY
Start: 2023-02-14

## 2023-08-11 RX ORDER — VALACYCLOVIR HYDROCHLORIDE 500 MG/1
1 TABLET, FILM COATED ORAL DAILY
COMMUNITY
Start: 2023-07-21

## 2023-08-11 RX ORDER — CYCLOBENZAPRINE HCL 10 MG
TABLET ORAL
COMMUNITY
Start: 2023-07-19

## 2023-08-11 RX ORDER — DOXYCYCLINE 100 MG/1
TABLET ORAL
COMMUNITY
Start: 2023-04-15

## 2023-08-11 RX ORDER — HYDROCODONE BITARTRATE AND ACETAMINOPHEN 5; 325 MG/1; MG/1
1 TABLET ORAL
COMMUNITY
Start: 2023-07-19

## 2023-08-11 RX ORDER — FLUCONAZOLE 150 MG/1
TABLET ORAL
COMMUNITY

## 2023-08-11 RX ORDER — MELATONIN
1000 DAILY
COMMUNITY

## 2023-08-11 RX ORDER — TRIAMCINOLONE ACETONIDE 0.15 MG/G
AEROSOL, SPRAY TOPICAL
COMMUNITY
Start: 2023-04-07

## 2023-08-11 NOTE — PROGRESS NOTES
[facility]   Obstetrics and Gynecology     2023    Patient: Gustavo Hernandez          MR#:[id number]    History of Present Illness    Chief Complaint   Patient presents with    Gynecologic Exam     CC: Annual, last pap pt had hyst, last mammo 2023 neg, unable to void       57 y.o. female  who presents for annual exam.    Relevant data reviewed:    No LMP recorded (lmp unknown). Patient has had a hysterectomy.  Obstetric History:  OB History        4    Para   4    Term   2            AB        Living   2       SAB        IAB        Ectopic        Molar        Multiple        Live Births   2               Menstrual History:     No LMP recorded (lmp unknown). Patient has had a hysterectomy.       Social History     Substance and Sexual Activity   Sexual Activity Yes    Partners: Male    Birth control/protection: Surgical, Post-menopausal    Comment: hysterectomy ,  to Denis Hernandez.     ______________________________________  Patient Active Problem List   Diagnosis    Crohn's disease    Ileostomy in place    Vulvovaginal candidiasis    Essential hypertension    Family history of premature coronary heart disease    Small bowel obstruction    Migraine without status migrainosus, not intractable    Hypertriglyceridemia    Arthralgia of right hand    Osteopenia, senile    Abnormality of left breast on screening mammogram    Seasonal allergies    Vitamin D deficiency     Past Medical History:   Diagnosis Date    Abnormal cortisol level 2019    Abnormal CT scan 2022    There is mention of compression of the left renal vein, which could be consistent with nutcracker syndrome.    Abnormal serum thyroxine (T4) level 2020    Abnormality of left breast on screening mammogram 2022    Actinic keratosis     FOLLOWED BY DR. DEEDEE NOE (acute kidney injury) 2021    Allergic rhinitis     Altered bowel elimination due to intestinal  ostomy     Anemia     Microcytic anemia.    Anxiety     Arthralgia of right hand 2/11/2022    Asthma     Chest pain 04/2021    Chronic abdominal pain     Chronic sinusitis     Complex cyst of uterine adnexa 07/2002    COPD (chronic obstructive pulmonary disease)     COVID-19 virus infection 01/14/2022    Crohn's disease     Decreased bowel sounds     Dehydration 06/2015    Depression     Diplopia 07/2018    Esophageal reflux     Essential hypertension 1/14/2019    Excessive menstruation 2002    Exertional chest pain 01/18/2019    SEEN AT [facility]    Family history of premature coronary heart disease 1/14/2019    Flatus, vaginalis     Gastroenteritis 06/19/2015    SEEN AT [facility]    Gastroesophageal reflux disease 11/8/2022    GERD (gastroesophageal reflux disease)     Hair loss 08/2019    Herpes     High risk medication use     History of MRSA infection     History of transfusion 2002    Hypertension     Hypertensive urgency 12/19/2018    SEEN AT [facility]    Hypertriglyceridemia     Hypokalemia 12/2018    Hypomagnesemia 12/2018    Ileostomy in place 10/24/2016    Ileostomy present     Impacted cerumen     Influenza 03/13/2016    Influenza A 07/22/2019    Insomnia 10/2018    Intestinal malabsorption     Intestinal malabsorption     Iron deficiency anemia     Kidney stones     Leukocytosis 06/2015    Macrocytosis     Menopausal flushing 11/8/2022    Microhematuria 05/2013    FOLLOWED BY DR. KULWINDER SANCHEZ    Migraine without status migrainosus, not intractable 4/26/2021    Migraines     MRSA infection     Nausea and vomiting 05/17/2022    Admitted, Nausea and vomiting, intractability of vomiting not specified, unspecified vomiting type. Van.   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 DEEDEE LUTHER    Sinusitis 2022    Small bowel obstruction 10/7/2019    Toxic colitis 1999    ADMITTED TO [facility]    Ulcerative colitis 1994    Ulcerative colitis     ulcerative colitis status post colectomy with ileostomy.    Vagabond's disease 2019    Vaginal atrophy     Vitamin D deficiency     Vulvovaginal candidiasis 2021     Past Surgical History:   Procedure Laterality Date    APPENDECTOMY N/A 2001    DR. MARY KATE NEWELL  AT [facility]     SECTION N/A 1988     SECTION N/A 1993     SECTION      COLECTOMY TOTAL N/A 2001    Total abdominal colectomy with J-pouch with diverting loop ileostomy AND APPENDECTOMY, DR. MARY KATE NEWELL AT [facility]    COLON SURGERY  2019    small bowel resection.    COLONOSCOPY N/A 2001    PROCTOSIGMOIDITIS, PSEUDOPOLYP AT 20 CM, DR. JOHNNY HOLCOMB AT [facility]    CYSTOSCOPY RETROGRADE PYELOGRAM Left 2013    Dr. Cr Yee AT [facility]    D & C HYSTEROSCOPY N/A 2013    FOCAL CYSTIC ATROPHY, ECC BENIGN, DR. VISH CROWDER  AT [facility]    ENDOSCOPY N/A 2019    SMALL BOWEL ENTEROSCOPY, EDEMA IN STOMACH, CHRONIC GASTRITIS, PATH: MILD REACTIVE/CHEMICAL GASTROPATHY, DR. RUBEN MANCUSO AT [facility]    ENDOSCOPY N/A 2022    Z-line was found at 38 cm and the site of hiatal narrowing was found at 40 cm from the incisors. Normal; esophagus, duodenum, & entire examined stomach,ISH VASQUEZ MD.    FLEXIBLE SIGMOIDOSCOPY N/A 1997    (+) IBD, PATH: ACTIVE  COLITIS, DR. JOHNNY HOLCOMB AT [facility] IN [facility]    HEMORRHOIDECTOMY  2006    HYSTERECTOMY N/A 2003    WITH BSO, DR. VISH CROWDER AT [facility] IN [facility]    ILEOSCOPY N/A 01/10/2002    WITH ANAL DILATION, (+) SEVERE POUCHITIS, DR. MARY KATE NEWELL  AT [facility]    ILEOSTOMY CLOSURE N/A 2001    DR. MARY KATE NEWELL AT [facility]    ILEOSTOMY REVISION N/A 2002    J POUCH REMOVAL WITH PERMANENT END ILEOSTOMYBURKE ILEOSTOMY CREATION, DR. MARY KATE NEWELL  AT [facility]    LAPAROSCOPIC CHOLECYSTECTOMY N/A 2010    w/ cholangiogram and adhesiolysis-Dr. Mary Kate Avila AT [facility]    LAPAROSCOPIC LYSIS OF ADHESIONS N/A 2003    EXPLORATORY LAPAROSCOPY WITH LYSIS, DR. MARY KATE NEWELL  AT [facility] [place] [facility]    PELVIC LAPAROSCOPY N/A 2019    EXPLORATORY LAPAROSCOPY WITH PELVIC MASS EXCISION, DR. DOC BECERRIL AT [facility]    PERIPHERALLY INSERTED CENTRAL CATHETER INSERTION Right 02/15/2019    RIGHT SUBCLAVIAN, DR. CHARLES MARAVILLA AT [facility]    POUCHOSCOPY N/A 2001    POUCHOGRAM, WNL, DR. MARY KATE NEWELL  AT [facility] [place] [facility]    SALPINGO OOPHORECTOMY Bilateral 2003    DR. VISH CROWDER  AT [facility] [place] [facility]    UMBILICAL HERNIA REPAIR N/A     UPPER GASTROINTESTINAL ENDOSCOPY N/A 2011    Normal esophagus, normal stomach, erythematous duodenopathy-Dr. Jai Khan    UPPER GASTROINTESTINAL ENDOSCOPY N/A 2010    Normal esophagus, normal stomach, normal duodneum-Dr. Jai Khan AT [facility]     Social History     Tobacco Use   Smoking Status Former    Packs/day: 0.25    Types: Cigarettes    Quit date:     Years since quittin.6    Passive exposure: Past   Smokeless Tobacco Former    Quit date:      Family History   Problem Relation Age of Onset    Mental illness Mother     Heart disease Mother     Diabetes Mother     Hypertension Mother     Arthritis Mother          RHEUMATOID    Colon polyps Mother     Rheum arthritis Mother     Hyperlipidemia Mother     Colonic polyp Mother     Asthma Father     Heart disease Father     Hypertension Father     Stroke Father     Alcohol abuse Brother     Arthritis Brother     Osteoporosis Maternal Grandmother     Cancer Paternal Grandmother     Colon cancer Paternal Grandmother         unsure of age    Colon cancer Paternal Grandfather     Osteoporosis Maternal Aunt     Colon cancer Paternal Aunt     Cancer Paternal Aunt     Colon cancer Paternal Uncle     Colon cancer Cousin     Breast cancer Neg Hx     Ovarian cancer Neg Hx     Uterine cancer Neg Hx     Deep vein thrombosis Neg Hx     Pulmonary embolism Neg Hx     Melanoma Neg Hx     Prostate cancer Neg Hx      Prior to Admission medications    Medication Sig Start Date End Date Taking? Authorizing Provider   acyclovir (ZOVIRAX) 400 MG tablet 1 tablet Orally Twice a day for 5 days PRN Outbreak for 90 days   Yes ProviderCristina MD   ALPRAZolam (NIRAVAM) 1 MG disintegrating tablet Place 1 tablet on the tongue At Night As Needed for Anxiety.   Yes ProviderCristina MD   cetirizine (zyrTEC) 10 MG tablet Take 1 tablet by mouth Daily. 2/18/22  Yes ProviderCristina MD   cholecalciferol (VITAMIN D3) 25 MCG (1000 UT) tablet Take 1 tablet by mouth Daily.   Yes ProviderCristina MD   clindamycin (CLEOCIN T) 1 % lotion APPLY SMALL AMOUNT ONCE DAILY TO CHIN 8/11/22  Yes Provider, MD Cristina   cyclobenzaprine (FLEXERIL) 10 MG tablet TAKE 1 TABLET BY MOUTH THREE TIMES DAILY FOR UP TO 14 DAYS AS NEEDED FOR MUSCLE SPASMS 7/19/23  Yes Provider, MD Cristina   doxycycline (ADOXA) 100 MG tablet  4/15/23  Yes ProviderCristina MD   eletriptan (RELPAX) 40 MG tablet Take one tablet by mouth at onset of migraine. May repeat in 2 hours if needed. Do not exceed 2 tablets in 24 hours. 10/18/18  Yes Chao Salas APRN   Emgality 120 MG/ML auto-injector pen   5/12/21  Yes Cristina Norris MD   EPINEPHrine (EPIPEN) 0.3 MG/0.3ML solution auto-injector injection EpiPen   Yes Cristina Norris MD   estradiol (ESTRACE) 1 MG tablet TAKE ONE TABLET BY MOUTH ONCE DAILY 12/12/22  Yes Cristina Norris MD   fluconazole (DIFLUCAN) 150 MG tablet 1 po once PRN  Yeast for 90 days   Yes Cristina Norris MD   HYDROcodone-acetaminophen (NORCO) 5-325 MG per tablet Take 1 tablet by mouth. 7/19/23  Yes Cristina Norris MD   hydrOXYzine (ATARAX) 25 MG tablet  8/11/22  Yes Cristina Norris MD   lisinopril (PRINIVIL,ZESTRIL) 10 MG tablet TAKE 1 TABLET BY MOUTH ONCE DAILY FOR 30 DAYS 7/8/19  Yes Cristina Norris MD   Multiple Vitamins-Minerals (MULTIVITAMIN PO) Take 1 tablet by mouth Daily.   Yes Cristina Norris MD   nystatin-triamcinolone (MYCOLOG II) 462343-5.1 UNIT/GM-% cream APPLY SMALL AMOUNT UNDER BREASTS AND ABDOMEN EVERY EVENING 8/11/22  Yes Cristina Norris MD   omeprazole (PriLOSEC) 40 MG capsule Take 1 capsule by mouth Daily. 10/3/16  Yes Cristina Norris MD   ondansetron ODT (ZOFRAN-ODT) 8 MG disintegrating tablet TAKE ONE TABLET BY MOUTH AND allow TO dissolve ONCE DAILY AS NEEDED. CALL [facility] FOR REFILL 4/14/21  Yes Cristina Norris MD   prochlorperazine (COMPAZINE) 10 MG tablet TAKE ONE TABLET BY MOUTH THREE TIMES DAILY AS NEEDED CALL [facility] FOR REFILL 6/24/22  Yes Cristina Norris MD   promethazine-dextromethorphan (PROMETHAZINE-DM) 6.25-15 MG/5ML syrup TAKE 2.5 TO 5 ML BY MOUTH FOUR TIMES DAILY FOR UP TO 7 DAYS AS NEEDED FOR COUGH 2/14/23  Yes Cristina Norris MD   pseudoephedrine (SUDAFED) 30 MG tablet Every 6 (Six) Hours. 7/1/22  Yes Cristina Norris MD   rizatriptan (MAXALT) 10 MG tablet Take 1 tablet by mouth 1 (One) Time As Needed for Migraine. May repeat in 2 hours if needed 10/18/18  Yes Maritza, Chao Sima, APRN   simethicone (MYLICON) 80 MG chewable tablet  2/11/22  Yes Provider, MD Cristina  "  triamcinolone (KENALOG) 0.147 MG/GM topical spray APPLY TO INFLAMMED SKIN AROUND STOMA SITE TWICE DAILY - NOT FOR CONTINUOUS USE. 4/7/23  Yes Provider, MD Cristina   valACYclovir (VALTREX) 500 MG tablet Take 1 tablet by mouth Daily. 7/21/23  Yes Provider, MD Cristina   [employer] Allergy 25 MG capsule  12/16/22  Yes Provider, MD Cristina   clobetasol (TEMOVATE) 0.05 % cream Apply 1 application topically to the appropriate area as directed Daily.  Patient not taking: Reported on 8/11/2023 11/18/22   Yulia Dominguez MD   dicyclomine (BENTYL) 10 MG capsule Take 1 capsule by mouth 4 (Four) Times a Day.  Patient not taking: Reported on 8/11/2023 12/16/22 12/16/23  Yulia Dominguez MD     _______________________________________    Current contraception: status post hysterectomy  History of abnormal Pap smear: no  Family history of uterine or ovarian cancer: no  Family History of colon cancer/colon polyps: no  History of abnormal mammogram: no  History of abnormal lipids: no    The following portions of the patient's history were reviewed and updated as appropriate: allergies, current medications, past family history, past medical history, past social history, past surgical history and problem list.    Review of Systems    Pertinent items are noted in HPI.       Objective   Physical Exam    /64   Ht 160 cm (63\")   Wt 66.7 kg (147 lb)   LMP  (LMP Unknown)   BMI 26.04 kg/mý    BP Readings from Last 3 Encounters:   08/11/23 120/64   01/09/23 106/74   12/16/22 110/88      Wt Readings from Last 3 Encounters:   08/11/23 66.7 kg (147 lb)   01/09/23 64 kg (141 lb)   12/16/22 62.4 kg (137 lb 8 oz)        BMI: Estimated body mass index is 26.04 kg/mý as calculated from the following:    Height as of this encounter: 160 cm (63\").    Weight as of this encounter: 66.7 kg (147 lb).       General: alert, appears stated age and cooperative   Heart: regular rate and rhythm, S1, S2 normal, no murmur, click, rub or gallop "   Lungs: clear to auscultation bilaterally   Abdomen: soft, non-tender, without masses, no organomegaly   Breast: inspection negative, no nipple discharge or bleeding, no masses or nodularity palpable   External genitalia/Vulva: External genitalia including bartholin's glands, Urethra, Delcambre's gland and urethra meatus are normal, Perineum, rectum and anus appear normal  and Bladder appears normal without significant prolapse    Vagina: normal mucosa, normal discharge   Cervix: absent    Uterus: absent    Adnexa: exam limited by habitus and multiple prior surgery and ostomy      As part of wellness and prevention, the following topics were discussed with the patient:  Encouraged self breast exam  Physical activity and regular exercised encouraged.         Problem List   Meds  History  Prep for Surg   Imagin}    Assessment:  Diagnoses and all orders for this visit:    1. Well female exam with routine gynecological exam (Primary)    Diagnoses and all orders for this visit:    1. Well female exam with routine gynecological exam (Primary)  -     Cancel: IGP, Apt HPV,rfx 16 / 18,45    2. Screening for blood or protein in urine  -     POC Urinalysis Dipstick    3. Essential hypertension    4. Crohn's disease of small intestine with fistula    5. Ileostomy in place    Other orders  -     Cancel: Mammo Screening Digital Tomosynthesis Bilateral With CAD; Future  -     Cancel: NuSwab VG+ - Swab, Vagina          Plan:  Return in 1 year (on 2024) for Annual exam.    Burak Morley MD  2023 12:39 EDT

## 2023-09-03 ENCOUNTER — APPOINTMENT (OUTPATIENT)
Dept: CT IMAGING | Facility: HOSPITAL | Age: 58
End: 2023-09-03
Payer: COMMERCIAL

## 2023-09-03 ENCOUNTER — HOSPITAL ENCOUNTER (EMERGENCY)
Facility: HOSPITAL | Age: 58
Discharge: HOME | End: 2023-09-03
Attending: EMERGENCY MEDICINE | Admitting: EMERGENCY MEDICINE
Payer: COMMERCIAL

## 2023-09-03 VITALS
RESPIRATION RATE: 18 BRPM | SYSTOLIC BLOOD PRESSURE: 104 MMHG | DIASTOLIC BLOOD PRESSURE: 76 MMHG | HEIGHT: 63 IN | WEIGHT: 145 LBS | BODY MASS INDEX: 25.69 KG/M2 | HEART RATE: 92 BPM | TEMPERATURE: 98.6 F | OXYGEN SATURATION: 97 %

## 2023-09-03 DIAGNOSIS — M79.18 MUSCULOSKELETAL PAIN: Primary | ICD-10-CM

## 2023-09-03 LAB
ALBUMIN SERPL-MCNC: 4.2 G/DL (ref 3.5–5.2)
ALBUMIN/GLOB SERPL: 1.3 G/DL
ALP SERPL-CCNC: 41 U/L (ref 39–117)
ALT SERPL W P-5'-P-CCNC: 21 U/L (ref 1–33)
ANION GAP SERPL CALCULATED.3IONS-SCNC: 15.2 MMOL/L (ref 5–15)
AST SERPL-CCNC: 27 U/L (ref 1–32)
BASOPHILS # BLD AUTO: 0.06 10*3/MM3 (ref 0–0.2)
BASOPHILS NFR BLD AUTO: 0.7 % (ref 0–1.5)
BILIRUB SERPL-MCNC: 0.6 MG/DL (ref 0–1.2)
BILIRUB UR QL STRIP: NEGATIVE
BUN SERPL-MCNC: 17 MG/DL (ref 6–20)
BUN/CREAT SERPL: 17 (ref 7–25)
CALCIUM SPEC-SCNC: 9.3 MG/DL (ref 8.6–10.5)
CHLORIDE SERPL-SCNC: 101 MMOL/L (ref 98–107)
CLARITY UR: CLEAR
CO2 SERPL-SCNC: 18.8 MMOL/L (ref 22–29)
COLOR UR: YELLOW
CREAT SERPL-MCNC: 1 MG/DL (ref 0.57–1)
DEPRECATED RDW RBC AUTO: 47.6 FL (ref 37–54)
EGFRCR SERPLBLD CKD-EPI 2021: 66.3 ML/MIN/1.73
EOSINOPHIL # BLD AUTO: 0.24 10*3/MM3 (ref 0–0.4)
EOSINOPHIL NFR BLD AUTO: 3 % (ref 0.3–6.2)
ERYTHROCYTE [DISTWIDTH] IN BLOOD BY AUTOMATED COUNT: 12.6 % (ref 12.3–15.4)
GLOBULIN UR ELPH-MCNC: 3.2 GM/DL
GLUCOSE SERPL-MCNC: 84 MG/DL (ref 65–99)
GLUCOSE UR STRIP-MCNC: NEGATIVE MG/DL
HCT VFR BLD AUTO: 43.7 % (ref 34–46.6)
HGB BLD-MCNC: 14.4 G/DL (ref 12–15.9)
HGB UR QL STRIP.AUTO: NEGATIVE
IMM GRANULOCYTES # BLD AUTO: 0.01 10*3/MM3 (ref 0–0.05)
IMM GRANULOCYTES NFR BLD AUTO: 0.1 % (ref 0–0.5)
KETONES UR QL STRIP: NEGATIVE
LEUKOCYTE ESTERASE UR QL STRIP.AUTO: NEGATIVE
LIPASE SERPL-CCNC: 41 U/L (ref 13–60)
LYMPHOCYTES # BLD AUTO: 3.31 10*3/MM3 (ref 0.7–3.1)
LYMPHOCYTES NFR BLD AUTO: 40.9 % (ref 19.6–45.3)
MCH RBC QN AUTO: 33 PG (ref 26.6–33)
MCHC RBC AUTO-ENTMCNC: 33 G/DL (ref 31.5–35.7)
MCV RBC AUTO: 100 FL (ref 79–97)
MONOCYTES # BLD AUTO: 0.6 10*3/MM3 (ref 0.1–0.9)
MONOCYTES NFR BLD AUTO: 7.4 % (ref 5–12)
NEUTROPHILS NFR BLD AUTO: 3.88 10*3/MM3 (ref 1.7–7)
NEUTROPHILS NFR BLD AUTO: 47.9 % (ref 42.7–76)
NITRITE UR QL STRIP: NEGATIVE
PH UR STRIP.AUTO: 5.5 [PH] (ref 4.5–8)
PLATELET # BLD AUTO: 249 10*3/MM3 (ref 140–450)
PMV BLD AUTO: 9.7 FL (ref 6–12)
POTASSIUM SERPL-SCNC: 4.5 MMOL/L (ref 3.5–5.2)
PROT SERPL-MCNC: 7.4 G/DL (ref 6–8.5)
PROT UR QL STRIP: NEGATIVE
RBC # BLD AUTO: 4.37 10*6/MM3 (ref 3.77–5.28)
SODIUM SERPL-SCNC: 135 MMOL/L (ref 136–145)
SP GR UR STRIP: 1.02 (ref 1–1.03)
UROBILINOGEN UR QL STRIP: NORMAL
WBC NRBC COR # BLD: 8.1 10*3/MM3 (ref 3.4–10.8)

## 2023-09-03 PROCEDURE — 36415 COLL VENOUS BLD VENIPUNCTURE: CPT

## 2023-09-03 PROCEDURE — 96374 THER/PROPH/DIAG INJ IV PUSH: CPT

## 2023-09-03 PROCEDURE — 74176 CT ABD & PELVIS W/O CONTRAST: CPT

## 2023-09-03 PROCEDURE — 81003 URINALYSIS AUTO W/O SCOPE: CPT | Performed by: EMERGENCY MEDICINE

## 2023-09-03 PROCEDURE — 96375 TX/PRO/DX INJ NEW DRUG ADDON: CPT

## 2023-09-03 PROCEDURE — 25010000002 ONDANSETRON PER 1 MG: Performed by: EMERGENCY MEDICINE

## 2023-09-03 PROCEDURE — 99284 EMERGENCY DEPT VISIT MOD MDM: CPT

## 2023-09-03 PROCEDURE — 83690 ASSAY OF LIPASE: CPT | Performed by: EMERGENCY MEDICINE

## 2023-09-03 PROCEDURE — 25010000002 MORPHINE PER 10 MG: Performed by: EMERGENCY MEDICINE

## 2023-09-03 PROCEDURE — 80053 COMPREHEN METABOLIC PANEL: CPT | Performed by: EMERGENCY MEDICINE

## 2023-09-03 PROCEDURE — 85025 COMPLETE CBC W/AUTO DIFF WBC: CPT | Performed by: EMERGENCY MEDICINE

## 2023-09-03 RX ORDER — METAXALONE 800 MG/1
800 TABLET ORAL 3 TIMES DAILY
Qty: 21 TABLET | Refills: 0 | Status: SHIPPED | OUTPATIENT
Start: 2023-09-03

## 2023-09-03 RX ORDER — ONDANSETRON 2 MG/ML
4 INJECTION INTRAMUSCULAR; INTRAVENOUS ONCE
Status: COMPLETED | OUTPATIENT
Start: 2023-09-03 | End: 2023-09-03

## 2023-09-03 RX ORDER — MELOXICAM 15 MG/1
15 TABLET ORAL DAILY
Qty: 7 TABLET | Refills: 0 | Status: SHIPPED | OUTPATIENT
Start: 2023-09-03

## 2023-09-03 RX ADMIN — MORPHINE SULFATE 4 MG: 4 INJECTION INTRAVENOUS at 09:44

## 2023-09-03 RX ADMIN — ONDANSETRON 4 MG: 2 INJECTION INTRAMUSCULAR; INTRAVENOUS at 09:43

## 2023-09-03 NOTE — ED PROVIDER NOTES
Subjective   History of Present Illness  57-year-old female with presents emergency room complaint of right-sided flank and back pain for the last 4 days slowly worsening.  Patient states the pain is intermittent in intensity, is not affected by movement or positions but she does notice that when she lays on her right side at night it does make it hurt worse.  Patient states this feels similar to past kidney stones.  Patient has been taking Tylenol and Aleve with minimal effect.  Patient denies fever cough shortness of breath chest pain anorexia nausea vomiting diarrhea or dysuria    Review of Systems   Constitutional:  Negative for activity change, appetite change, chills, diaphoresis, fatigue and fever.   HENT:  Negative for congestion, rhinorrhea and sore throat.    Eyes:  Negative for photophobia and visual disturbance.   Respiratory:  Negative for cough and shortness of breath.    Cardiovascular:  Negative for chest pain, palpitations and leg swelling.   Gastrointestinal:  Negative for abdominal distention, abdominal pain, diarrhea, nausea and vomiting.   Genitourinary:  Positive for flank pain. Negative for dysuria.   Musculoskeletal:  Positive for back pain. Negative for arthralgias.   Skin:  Negative for rash.   Neurological:  Negative for dizziness, weakness and headaches.   Psychiatric/Behavioral:  Negative for agitation and behavioral problems.      Past Medical History:   Diagnosis Date    Abnormal cortisol level 03/2019    Abnormal CT scan 04/2022    There is mention of compression of the left renal vein, which could be consistent with nutcracker syndrome.    Abnormal serum thyroxine (T4) level 07/2020    Abnormality of left breast on screening mammogram 7/16/2022    Actinic keratosis     FOLLOWED BY DR. DEEDEE NOE (acute kidney injury) 06/2021    Allergic rhinitis     Altered bowel elimination due to intestinal ostomy     Anemia     Microcytic anemia.    Anxiety     Arthralgia of right hand  2/11/2022    Asthma     Chest pain 04/2021    Chronic abdominal pain     Chronic sinusitis     Complex cyst of uterine adnexa 07/2002    COPD (chronic obstructive pulmonary disease)     COVID-19 virus infection 01/14/2022    Crohn's disease     Decreased bowel sounds     Dehydration 06/2015    Depression     Diplopia 07/2018    Esophageal reflux     Essential hypertension 1/14/2019    Excessive menstruation 2002    Exertional chest pain 01/18/2019    SEEN AT [facility]    Family history of premature coronary heart disease 1/14/2019    Flatus, vaginalis     Gastroenteritis 06/19/2015    SEEN AT [facility]    Gastroesophageal reflux disease 11/8/2022    GERD (gastroesophageal reflux disease)     Hair loss 08/2019    Herpes     High risk medication use     History of MRSA infection     History of transfusion 2002    Hypertension     Hypertensive urgency 12/19/2018    SEEN AT [facility]    Hypertriglyceridemia     Hypokalemia 12/2018    Hypomagnesemia 12/2018    Ileostomy in place 10/24/2016    Ileostomy present     Impacted cerumen     Influenza 03/13/2016    Influenza A 07/22/2019    Insomnia 10/2018    Intestinal malabsorption     Intestinal malabsorption     Iron deficiency anemia     Kidney stones     Leukocytosis 06/2015    Macrocytosis     Menopausal flushing 11/8/2022    Microhematuria 05/2013    FOLLOWED BY DR. KULWINDER SANCHEZ    Migraine without status migrainosus, not intractable 4/26/2021    Migraines     MRSA infection     Nausea and vomiting 05/17/2022    Admitted, Nausea and vomiting, intractability of vomiting not specified, unspecified vomiting type. [place].    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 [place] [facility]    SBO (small bowel obstruction) 10/07/2019    ADMITTED TO [facility]    SB (small bowel obstruction) 03/05/2014     ADMITTED TO [facility]    SBO (small bowel obstruction) 2003    SBO (small bowel obstruction) 2019    SEEN AT [facility] ER    Seasonal allergies 2022    Seborrheic keratosis     FOLLOWED BY DR. DEEDEE LUTHER    Sinusitis 2022    Small bowel obstruction 10/7/2019    Toxic colitis 1999    ADMITTED TO [facility]    Ulcerative colitis 1994    Ulcerative colitis     ulcerative colitis status post colectomy with ileostomy.    Vagabond's disease 2019    Vaginal atrophy     Vitamin D deficiency     Vulvovaginal candidiasis 2021       Allergies   Allergen Reactions    Iodinated Contrast Media Anaphylaxis    Iodine Anaphylaxis    Metoclopramide Anxiety, Dizziness, Nausea Only, Rash and Palpitations     AKA REGLAN    Propoxyphene Nausea Only, Anxiety, Rash and Dizziness     AKA DARVOCET       Past Surgical History:   Procedure Laterality Date    APPENDECTOMY N/A 2001    DR. MARY KATE NEWELL  AT [facility]     SECTION N/A 1988     SECTION N/A 1993     SECTION      COLECTOMY TOTAL N/A 2001    Total abdominal colectomy with J-pouch with diverting loop ileostomy AND APPENDECTOMY, DR. MARY KATE NEWELL AT [facility]    COLON SURGERY  2019    small bowel resection.    COLONOSCOPY N/A 2001    PROCTOSIGMOIDITIS, PSEUDOPOLYP AT 20 CM, DR. JOHNNY HOLCOMB AT [facility]    CYSTOSCOPY RETROGRADE PYELOGRAM Left 2013    Dr. Cr Yee AT [facility]    D & C HYSTEROSCOPY N/A 2013    FOCAL CYSTIC ATROPHY, ECC BENIGN, DR. VISH CROWDER  AT [facility]    ENDOSCOPY N/A 2019    SMALL BOWEL ENTEROSCOPY, EDEMA IN STOMACH, CHRONIC GASTRITIS, PATH: MILD REACTIVE/CHEMICAL GASTROPATHY, DR. RUBEN MANCUSO AT [facility]    ENDOSCOPY N/A 2022    Z-line was found at 38 cm and the site of hiatal narrowing was found at 40 cm from the incisors. Normal; esophagus, duodenum, & entire examined  stomach,ISH VASQUEZ MD.    FLEXIBLE SIGMOIDOSCOPY N/A 02/06/1997    (+) IBD, PATH: ACTIVE COLITIS, DR. JOHNNY HOLCOMB AT [facility] [place] [facility]    HEMORRHOIDECTOMY  2006    HYSTERECTOMY N/A 08/28/2003    WITH BSO, DR. VISH CROWDER AT [facility] [place] [facility]    ILEOSCOPY N/A 01/10/2002    WITH ANAL DILATION, (+) SEVERE POUCHITIS, DR. MARY KATE NEWELL  AT [facility] [place] [facility]    ILEOSTOMY CLOSURE N/A 07/30/2001    DR. MARY KATE NEWELL AT [facility] [place] [facility]    ILEOSTOMY REVISION N/A 01/25/2002    J POUCH REMOVAL WITH PERMANENT END ILEOSTOMYBURKE ILEOSTOMY CREATION, DR. MARY KATE NEWELL  AT [facility] [place] [facility]    LAPAROSCOPIC CHOLECYSTECTOMY N/A 09/03/2010    w/ cholangiogram and adhesiolysis-Dr. Mary Kate Avila AT [facility]    LAPAROSCOPIC LYSIS OF ADHESIONS N/A 08/28/2003    EXPLORATORY LAPAROSCOPY WITH LYSIS, DR. MARY KATE NEWELL  AT [facility] [place] [facility]    PELVIC LAPAROSCOPY N/A 01/31/2019    EXPLORATORY LAPAROSCOPY WITH PELVIC MASS EXCISION, DR. DOC BECERRIL AT [facility]    PERIPHERALLY INSERTED CENTRAL CATHETER INSERTION Right 02/15/2019    RIGHT SUBCLAVIAN, DR. CHARLES MARAVILLA AT [facility]    POUCHOSCOPY N/A 07/26/2001    POUCHOGRAM, WNL, DR. MARY KATE NEWELL  AT [facility] [place] [facility]    SALPINGO OOPHORECTOMY Bilateral 08/28/2003    DR. VISH CROWDER  AT [facility] [place] [facility]    UMBILICAL HERNIA REPAIR N/A 2000    UPPER GASTROINTESTINAL ENDOSCOPY N/A 09/01/2011    Normal esophagus, normal stomach, erythematous duodenopathy-Dr. Jai Khan    UPPER GASTROINTESTINAL ENDOSCOPY N/A 05/20/2010    Normal esophagus, normal stomach, normal duodneum-Dr. Jai Khan AT [facility]       Family History   Problem Relation Age of Onset    Mental illness Mother     Heart disease Mother     Diabetes Mother     Hypertension Mother     Arthritis Mother         RHEUMATOID    Colon polyps Mother     Rheum arthritis Mother     Hyperlipidemia Mother     Colonic polyp Mother     Asthma Father     Heart disease  Father     Hypertension Father     Stroke Father     Alcohol abuse Brother     Arthritis Brother     Osteoporosis Maternal Grandmother     Cancer Paternal Grandmother     Colon cancer Paternal Grandmother         unsure of age    Colon cancer Paternal Grandfather     Osteoporosis Maternal Aunt     Colon cancer Paternal Aunt     Cancer Paternal Aunt     Colon cancer Paternal Uncle     Colon cancer Cousin     Breast cancer Neg Hx     Ovarian cancer Neg Hx     Uterine cancer Neg Hx     Deep vein thrombosis Neg Hx     Pulmonary embolism Neg Hx     Melanoma Neg Hx     Prostate cancer Neg Hx        Social History     Socioeconomic History    Marital status:      Spouse name: Denis Hernandez.    Number of children: 2    Years of education: 14   Tobacco Use    Smoking status: Former     Packs/day: 0.25     Types: Cigarettes     Quit date:      Years since quittin.6     Passive exposure: Past    Smokeless tobacco: Former     Quit date:    Vaping Use    Vaping Use: Never used    Passive vaping exposure: Yes   Substance and Sexual Activity    Alcohol use: No     Comment: socially    Drug use: Yes    Sexual activity: Yes     Partners: Male     Birth control/protection: Surgical, Post-menopausal     Comment: hysterectomy ,  to Denis Hernandez.           Objective   Physical Exam  Vitals and nursing note reviewed.   Constitutional:       General: She is not in acute distress.     Appearance: Normal appearance. She is not ill-appearing, toxic-appearing or diaphoretic.   HENT:      Head: Normocephalic and atraumatic.      Nose: Nose normal.      Mouth/Throat:      Mouth: Mucous membranes are moist.   Eyes:      Conjunctiva/sclera: Conjunctivae normal.   Cardiovascular:      Rate and Rhythm: Normal rate and regular rhythm.      Pulses: Normal pulses.   Pulmonary:      Effort: Pulmonary effort is normal.      Breath sounds: Normal breath sounds.   Abdominal:      General: Abdomen is flat. There is no  distension.      Tenderness: There is no abdominal tenderness.   Musculoskeletal:         General: No swelling. Normal range of motion.      Cervical back: Normal range of motion and neck supple.      Comments: Patient has no tenderness palpation over her thoracic or lumbar spine.  The area that she is describing her pain emanating from is her right thoracic and right flank.     Skin:     General: Skin is warm and dry.   Neurological:      General: No focal deficit present.      Mental Status: She is alert and oriented to person, place, and time.   Psychiatric:         Mood and Affect: Mood normal.       Procedures           ED Course  ED Course as of 09/03/23 0940   Sun Sep 03, 2023   0936 Urinalysis is unremarkable. [BH]   0936 CMP is unremarkable with exception of CO2's mildly decreased [BH]   0937 CBC shows no white count or left shift.  H&H and platelets are within normal limits [BH]   0937 Lipase is 41 and unremarkable [BH]   0937 CT abdomen without contrast:  IMPRESSION:  1. No radiopaque stone or hydronephrosis. No evidence of recently passed  stone in the bladder.  2. Postoperative changes as described. No new bowel obstruction  drainable fluid collection or focal area of inflammatory change. Stable  right lower quadrant and ileostomy.        This report was finalized on 9/3/2023 9:30 AM by Dr. Claus Stein MD.      []   0937 Discussed with patient lab and radiologic findings specifically no acute cause for patient's symptoms delineated.  We will treat patient for musculoskeletal pain with follow-up primary care as needed.  Patient can also return to emergency room for new persistent or worsening symptoms. []   0938 Patient's symptoms delineated []      ED Course User Index  [] Skyler Torres MD                                           Medical Decision Making  My differential diagnosis for abdominal pain includes but is not limited to:  Gastritis, gastroenteritis, peptic ulcer disease,  GERD, esophageal perforation, acute appendicitis, mesenteric adenitis, Meckel's diverticulum, epiploic appendagitis, diverticulitis, colon cancer, ulcerative colitis, Crohn's disease, intussusception, small bowel obstruction, adhesions, ischemic bowel, perforated viscus, ileus, obstipation, biliary colic, cholecystitis, cholelithiasis, Angel-Ronen Anshu, hepatitis, pancreatitis, common bile duct obstruction, cholangitis, bile leak, splenic trauma, splenic rupture, splenic infarction, splenic abscess, abdominal wall hematoma, abdominal wall abscess, intra-abdominal abscess, ascites, spontaneous bacterial peritonitis, hernia, UTI, cystitis, prostatitis, ureterolithiasis, urinary obstruction, AAA, myocardial infarction, pneumonia, cancer, porphyria, DKA, medications, sickle cell, viral syndrome, zoster     Amount and/or Complexity of Data Reviewed  Labs: ordered. Decision-making details documented in ED Course.  Radiology: ordered. Decision-making details documented in ED Course.    Risk  Prescription drug management.        Final diagnoses:   Musculoskeletal pain       ED Disposition  ED Disposition       ED Disposition   Discharge    Condition   Stable    Comment   --               Eboyn Canas, APRN  [address] [facility] [address]  [phone]    Schedule an appointment as soon as possible for a visit   As needed    [facility] EMERGENCY DEPARTMENT  [address] [facility] [address] [phone]  Go to   As needed         Medication List        New Prescriptions      meloxicam 15 MG tablet  Commonly known as: MOBIC  Take 1 tablet by mouth Daily.     metaxalone 800 MG tablet  Commonly known as: SKELAXIN  Take 1 tablet by mouth 3 (Three) Times a Day.               Where to Get Your Medications        These medications were sent to [id number] - [place], [place] - [address] [place] [address] - [phone]  - [phone]   [address] [place]  [place] [address]      Phone: [phone]   meloxicam 15 MG tablet  metaxalone 800 MG tablet            Skyler Torres MD  09/03/23 6225

## 2024-05-21 ENCOUNTER — OFFICE (OUTPATIENT)
Dept: URBAN - METROPOLITAN AREA CLINIC 66 | Facility: CLINIC | Age: 59
End: 2024-05-21

## 2024-05-21 VITALS
SYSTOLIC BLOOD PRESSURE: 138 MMHG | OXYGEN SATURATION: 96 % | WEIGHT: 143 LBS | SYSTOLIC BLOOD PRESSURE: 152 MMHG | HEIGHT: 63 IN | HEART RATE: 96 BPM | DIASTOLIC BLOOD PRESSURE: 109 MMHG

## 2024-05-21 DIAGNOSIS — K63.8219 SMALL INTESTINAL BACTERIAL OVERGROWTH, UNSPECIFIED: ICD-10-CM

## 2024-05-21 DIAGNOSIS — Z93.2 ILEOSTOMY STATUS: ICD-10-CM

## 2024-05-21 PROCEDURE — 99213 OFFICE O/P EST LOW 20 MIN: CPT | Performed by: INTERNAL MEDICINE

## 2024-07-22 ENCOUNTER — HOSPITAL ENCOUNTER (OUTPATIENT)
Dept: CT IMAGING | Facility: HOSPITAL | Age: 59
Discharge: HOME | End: 2024-07-22
Admitting: NURSE PRACTITIONER
Payer: COMMERCIAL

## 2024-07-22 ENCOUNTER — TRANSCRIBE ORDERS (OUTPATIENT)
Dept: ADMINISTRATIVE | Facility: HOSPITAL | Age: 59
End: 2024-07-22
Payer: COMMERCIAL

## 2024-07-22 DIAGNOSIS — R10.84 ABDOMINAL PAIN, GENERALIZED: Primary | ICD-10-CM

## 2024-07-22 DIAGNOSIS — R10.84 ABDOMINAL PAIN, GENERALIZED: ICD-10-CM

## 2024-07-22 PROCEDURE — 74176 CT ABD & PELVIS W/O CONTRAST: CPT

## 2024-07-26 ENCOUNTER — TRANSCRIBE ORDERS (OUTPATIENT)
Dept: ADMINISTRATIVE | Facility: HOSPITAL | Age: 59
End: 2024-07-26
Payer: COMMERCIAL

## 2024-07-26 DIAGNOSIS — R10.9 FLANK PAIN: Primary | ICD-10-CM

## 2024-07-31 ENCOUNTER — HOSPITAL ENCOUNTER (OUTPATIENT)
Dept: ULTRASOUND IMAGING | Facility: HOSPITAL | Age: 59
Discharge: HOME | End: 2024-07-31
Admitting: NURSE PRACTITIONER
Payer: COMMERCIAL

## 2024-07-31 DIAGNOSIS — R10.9 FLANK PAIN: ICD-10-CM

## 2024-07-31 PROCEDURE — 76775 US EXAM ABDO BACK WALL LIM: CPT

## 2024-08-07 DIAGNOSIS — Z12.31 VISIT FOR SCREENING MAMMOGRAM: Primary | ICD-10-CM

## 2024-08-21 ENCOUNTER — OFFICE (OUTPATIENT)
Age: 59
End: 2024-08-21

## 2024-08-21 ENCOUNTER — OFFICE (OUTPATIENT)
Dept: URBAN - METROPOLITAN AREA CLINIC 76 | Facility: CLINIC | Age: 59
End: 2024-08-21

## 2024-08-21 VITALS
HEART RATE: 96 BPM | WEIGHT: 142 LBS | WEIGHT: 142 LBS | DIASTOLIC BLOOD PRESSURE: 82 MMHG | SYSTOLIC BLOOD PRESSURE: 137 MMHG | HEIGHT: 63 IN | DIASTOLIC BLOOD PRESSURE: 82 MMHG | OXYGEN SATURATION: 100 % | WEIGHT: 142 LBS | HEART RATE: 96 BPM | DIASTOLIC BLOOD PRESSURE: 82 MMHG | SYSTOLIC BLOOD PRESSURE: 137 MMHG | SYSTOLIC BLOOD PRESSURE: 137 MMHG | HEART RATE: 96 BPM | HEART RATE: 96 BPM | HEIGHT: 63 IN | HEART RATE: 96 BPM | WEIGHT: 142 LBS | DIASTOLIC BLOOD PRESSURE: 82 MMHG | HEIGHT: 63 IN | WEIGHT: 142 LBS | SYSTOLIC BLOOD PRESSURE: 137 MMHG | DIASTOLIC BLOOD PRESSURE: 82 MMHG | HEIGHT: 63 IN | OXYGEN SATURATION: 100 % | SYSTOLIC BLOOD PRESSURE: 137 MMHG | OXYGEN SATURATION: 100 % | SYSTOLIC BLOOD PRESSURE: 137 MMHG | OXYGEN SATURATION: 100 % | HEART RATE: 96 BPM | OXYGEN SATURATION: 100 % | SYSTOLIC BLOOD PRESSURE: 137 MMHG | HEIGHT: 63 IN | WEIGHT: 142 LBS | OXYGEN SATURATION: 100 % | OXYGEN SATURATION: 100 % | HEIGHT: 63 IN | WEIGHT: 142 LBS | DIASTOLIC BLOOD PRESSURE: 82 MMHG | HEART RATE: 96 BPM | DIASTOLIC BLOOD PRESSURE: 82 MMHG | HEIGHT: 63 IN

## 2024-08-21 DIAGNOSIS — K66.0 PERITONEAL ADHESIONS (POSTPROCEDURAL) (POSTINFECTION): ICD-10-CM

## 2024-08-21 PROCEDURE — 99214 OFFICE O/P EST MOD 30 MIN: CPT | Performed by: INTERNAL MEDICINE

## 2024-08-21 RX ORDER — CYCLOBENZAPRINE HYDROCHLORIDE 5 MG/1
15 TABLET, FILM COATED ORAL
Qty: 30 | Refills: 3 | Status: ACTIVE
Start: 2024-08-21

## 2024-08-28 ENCOUNTER — TRANSCRIBE ORDERS (OUTPATIENT)
Dept: ADMINISTRATIVE | Facility: HOSPITAL | Age: 59
End: 2024-08-28
Payer: COMMERCIAL

## 2024-08-28 DIAGNOSIS — K66.0 INTESTINAL ADHESIONS: ICD-10-CM

## 2024-08-28 DIAGNOSIS — R10.9 ABDOMINAL WALL PAIN: ICD-10-CM

## 2024-08-28 DIAGNOSIS — R10.12 ABDOMINAL PAIN, LUQ: Primary | ICD-10-CM

## 2024-09-05 ENCOUNTER — HOSPITAL ENCOUNTER (OUTPATIENT)
Dept: ULTRASOUND IMAGING | Facility: HOSPITAL | Age: 59
Discharge: HOME | End: 2024-09-05
Admitting: INTERNAL MEDICINE
Payer: COMMERCIAL

## 2024-09-05 ENCOUNTER — TRANSCRIBE ORDERS (OUTPATIENT)
Dept: ADMINISTRATIVE | Facility: HOSPITAL | Age: 59
End: 2024-09-05
Payer: COMMERCIAL

## 2024-09-05 DIAGNOSIS — R10.12 ABDOMINAL PAIN, LUQ: ICD-10-CM

## 2024-09-05 DIAGNOSIS — K66.0 INTESTINAL ADHESIONS: ICD-10-CM

## 2024-09-05 DIAGNOSIS — R10.9 ABDOMINAL WALL PAIN: ICD-10-CM

## 2024-09-05 DIAGNOSIS — R10.9 ABDOMINAL WALL PAIN: Primary | ICD-10-CM

## 2024-09-05 PROCEDURE — 76705 ECHO EXAM OF ABDOMEN: CPT

## 2024-10-11 ENCOUNTER — OFFICE VISIT (OUTPATIENT)
Dept: OBSTETRICS AND GYNECOLOGY | Age: 59
End: 2024-10-11
Payer: COMMERCIAL

## 2024-10-11 ENCOUNTER — HOSPITAL ENCOUNTER (OUTPATIENT)
Facility: HOSPITAL | Age: 59
Discharge: HOME | End: 2024-10-11
Admitting: OBSTETRICS & GYNECOLOGY
Payer: COMMERCIAL

## 2024-10-11 VITALS
DIASTOLIC BLOOD PRESSURE: 88 MMHG | BODY MASS INDEX: 24.45 KG/M2 | SYSTOLIC BLOOD PRESSURE: 138 MMHG | HEIGHT: 63 IN | WEIGHT: 138 LBS

## 2024-10-11 DIAGNOSIS — Z13.89 SCREENING FOR BLOOD OR PROTEIN IN URINE: ICD-10-CM

## 2024-10-11 DIAGNOSIS — Z12.31 SCREENING MAMMOGRAM FOR BREAST CANCER: ICD-10-CM

## 2024-10-11 DIAGNOSIS — Z12.31 VISIT FOR SCREENING MAMMOGRAM: ICD-10-CM

## 2024-10-11 DIAGNOSIS — Z01.419 WELL FEMALE EXAM WITH ROUTINE GYNECOLOGICAL EXAM: Primary | ICD-10-CM

## 2024-10-11 PROCEDURE — 77063 BREAST TOMOSYNTHESIS BI: CPT

## 2024-10-11 PROCEDURE — 77067 SCR MAMMO BI INCL CAD: CPT

## 2024-10-11 RX ORDER — DICYCLOMINE HCL 20 MG
TABLET ORAL
COMMUNITY
Start: 2024-08-02

## 2024-10-11 RX ORDER — DOXYCYCLINE HYCLATE 100 MG
1 TABLET ORAL EVERY 12 HOURS SCHEDULED
COMMUNITY
Start: 2024-09-03

## 2024-10-11 RX ORDER — PHENTERMINE HYDROCHLORIDE 37.5 MG/1
1 CAPSULE ORAL DAILY
COMMUNITY
Start: 2024-09-06

## 2024-10-11 RX ORDER — PHENTERMINE HYDROCHLORIDE 37.5 MG/1
37.5 TABLET ORAL DAILY
COMMUNITY
Start: 2024-05-15

## 2024-10-11 RX ORDER — AMITRIPTYLINE HYDROCHLORIDE 10 MG/1
TABLET ORAL
COMMUNITY
Start: 2024-09-30

## 2024-10-11 RX ORDER — NYSTATIN 100000 [USP'U]/G
POWDER TOPICAL
COMMUNITY
Start: 2024-09-30

## 2024-10-11 RX ORDER — DOXYCYCLINE HYCLATE 20 MG
20 TABLET ORAL
COMMUNITY
Start: 2024-08-16

## 2024-10-11 NOTE — PROGRESS NOTES
[facility]   Obstetrics and Gynecology     10/11/2024    Patient: Gustavo Hernandez          MR#:[id number]    History of Present Illness    Chief Complaint   Patient presents with    Gynecologic Exam     LAE 23, MG today, c-scope  (pt has an ileostomy)  no complaints today        58 y.o. female  who presents for annual exam.    The patient presents for her regular exam feeling well with some periodic left lower quadrant pain otherwise no major problems    Relevant data reviewed:    No LMP recorded (lmp unknown). Patient has had a hysterectomy.  Obstetric History:  OB History          4    Para   4    Term   2            AB        Living   2         SAB        IAB        Ectopic        Molar        Multiple        Live Births   2               Menstrual History:     No LMP recorded (lmp unknown). Patient has had a hysterectomy.       Social History     Substance and Sexual Activity   Sexual Activity Yes    Partners: Male    Birth control/protection: Surgical, Post-menopausal    Comment: hysterectomy ,  to Denis Hernandez.     ______________________________________  Patient Active Problem List   Diagnosis    Crohn's disease    Ileostomy in place    Vulvovaginal candidiasis    Essential hypertension    Family history of premature coronary heart disease    Small bowel obstruction    Migraine without status migrainosus, not intractable    Hypertriglyceridemia    Arthralgia of right hand    Osteopenia, senile    Abnormality of left breast on screening mammogram    Seasonal allergies    Vitamin D deficiency     Past Medical History:   Diagnosis Date    Abnormal cortisol level 2019    Abnormal CT scan 2022    There is mention of compression of the left renal vein, which could be consistent with nutcracker syndrome.    Abnormal serum thyroxine (T4) level 2020    Abnormality of left breast on screening mammogram 2022    Actinic keratosis     FOLLOWED BY DR. MARK  HOMA    HASMUKH (acute kidney injury) 06/2021    Altered bowel elimination due to intestinal ostomy     Anemia     Microcytic anemia.    Anxiety     Arthralgia of right hand 02/11/2022    Asthma     Complex cyst of uterine adnexa 07/2002    COPD (chronic obstructive pulmonary disease)     COVID-19 virus infection 01/14/2022    Crohn's disease     Decreased bowel sounds     Dehydration 06/2015    Depression     Diplopia 07/2018    Esophageal reflux     Essential hypertension 01/14/2019    Excessive menstruation 2002    Exertional chest pain 01/18/2019    SEEN AT [facility]    Family history of premature coronary heart disease 01/14/2019    Gastroenteritis 06/19/2015    SEEN AT [facility]    Gastroesophageal reflux disease 11/08/2022    GERD (gastroesophageal reflux disease)     Hair loss 08/2019    Herpes     High risk medication use     History of MRSA infection     History of transfusion 2002    Hypertension     Hypertensive urgency 12/19/2018    SEEN AT [facility]    Hypertriglyceridemia     Ileostomy in place 10/24/2016    Impacted cerumen     Influenza A 07/22/2019    Insomnia 10/2018    Intestinal malabsorption     Iron deficiency anemia     Kidney stones     Macrocytosis     Menopausal flushing 11/08/2022    Microhematuria 05/2013    FOLLOWED BY DR. KULWINDER SANCHEZ    Migraine without status migrainosus, not intractable 04/26/2021    Migraines     MRSA infection     Nausea and vomiting 05/17/2022    Admitted, Nausea and vomiting, intractability of vomiting not specified, unspecified vomiting type. [place].    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 DEEDEE LUTHER    Small bowel obstruction 10/07/2019    Toxic colitis 1999    ADMITTED TO [facility]    Ulcerative colitis 1994    Ulcerative colitis     ulcerative colitis status post colectomy with ileostomy.    Vaginal atrophy     Vitamin D deficiency      Past Surgical History:   Procedure Laterality Date    APPENDECTOMY N/A 2001    DR. MARY KATE NEWELL  AT [facility]     SECTION N/A 1988     SECTION N/A 1993     SECTION      COLECTOMY TOTAL N/A 2001    Total abdominal colectomy with J-pouch with diverting loop ileostomy AND APPENDECTOMY, DR. MARY KATE NEWELL AT [facility]    COLON SURGERY  2019    small bowel resection.    COLONOSCOPY N/A 2001    PROCTOSIGMOIDITIS, PSEUDOPOLYP AT 20 CM, DR. JOHNNY HOLCOMB AT [facility]    CYSTOSCOPY RETROGRADE PYELOGRAM Left 2013    Dr. Cr Yee AT [facility]    D & C HYSTEROSCOPY N/A 2013    FOCAL CYSTIC ATROPHY, ECC BENIGN, DR. VISH CROWDER  AT [facility]    ENDOSCOPY N/A 2019    SMALL BOWEL ENTEROSCOPY, EDEMA IN STOMACH, CHRONIC GASTRITIS, PATH: MILD REACTIVE/CHEMICAL GASTROPATHY, DR. RUBEN MANCUSO AT [facility]    ENDOSCOPY N/A 2022    Z-line was found at 38 cm and the site of hiatal narrowing was found at 40 cm from the incisors. Normal; esophagus, duodenum, & entire examined stomach,ISH VASQUEZ MD.    FLEXIBLE SIGMOIDOSCOPY N/A 1997    (+) IBD, PATH: ACTIVE COLITIS, DR. JOHNNY HOLCOMB AT [facility]    HEMORRHOIDECTOMY  2006    HYSTERECTOMY N/A 2003    WITH BSO, DR. VISH CROWDER AT [facility]    ILEOSCOPY N/A 01/10/2002    WITH ANAL DILATION, (+) SEVERE POUCHITIS, DR. MARY KATE NEWELL  AT [facility]    ILEOSTOMY CLOSURE N/A  2001    DR. MARY KATE NEWELL AT [facility] IN [facility]    ILEOSTOMY REVISION N/A 2002    J POUCH REMOVAL WITH PERMANENT END ILEOSTOMYBURKE ILEOSTOMY CREATION, DR. MARY KATE NEWELL  AT [facility] IN [facility]    LAPAROSCOPIC CHOLECYSTECTOMY N/A 2010    w/ cholangiogram and adhesiolysis-Dr. Mary Kate Avila AT [facility]    LAPAROSCOPIC LYSIS OF ADHESIONS N/A 2003    EXPLORATORY LAPAROSCOPY WITH LYSIS, DR. MARY KATE NEWELL  AT [facility] IN [facility]    PELVIC LAPAROSCOPY N/A 2019    EXPLORATORY LAPAROSCOPY WITH PELVIC MASS EXCISION, DR. DOC BECERRIL AT [place]    PERIPHERALLY INSERTED CENTRAL CATHETER INSERTION Right 02/15/2019    RIGHT SUBCLAVIAN, DR. CHARLES MARAVILLA AT [facility]    POUCHOSCOPY N/A 2001    POUCHOGRAM, WNL, DR. MARY KATE NEWELL  AT [facility]    SALPINGO OOPHORECTOMY Bilateral 2003    DR. VISH CROWDER  AT [facility] IN [facility]    UMBILICAL HERNIA REPAIR N/A     UPPER GASTROINTESTINAL ENDOSCOPY N/A 2011    Normal esophagus, normal stomach, erythematous duodenopathy-Dr. Jai Khan    UPPER GASTROINTESTINAL ENDOSCOPY N/A 2010    Normal esophagus, normal stomach, normal duodneum-Dr. Jai Khan AT [facility]     Social History     Tobacco Use   Smoking Status Former    Current packs/day: 0.00    Types: Cigarettes    Quit date:     Years since quittin.8    Passive exposure: Past   Smokeless Tobacco Former    Quit date:      Family History   Problem Relation Age of Onset    Mental illness Mother     Heart disease Mother     Diabetes Mother     Hypertension Mother     Arthritis Mother         RHEUMATOID    Colon polyps Mother     Rheum arthritis Mother     Hyperlipidemia Mother     Colonic polyp Mother     Asthma Father     Heart disease Father     Hypertension Father     Stroke Father     Alcohol abuse Brother     Arthritis Brother     Osteoporosis Maternal Grandmother     Cancer Paternal Grandmother     Colon cancer Paternal  Grandmother         unsure of age    Colon cancer Paternal Grandfather     Osteoporosis Maternal Aunt     Colon cancer Paternal Aunt     Cancer Paternal Aunt     Colon cancer Paternal Uncle     Colon cancer Cousin     Breast cancer Neg Hx     Ovarian cancer Neg Hx     Uterine cancer Neg Hx     Deep vein thrombosis Neg Hx     Pulmonary embolism Neg Hx     Melanoma Neg Hx     Prostate cancer Neg Hx      Prior to Admission medications    Medication Sig Start Date End Date Taking? Authorizing Provider   acyclovir (ZOVIRAX) 400 MG tablet 1 tablet Orally Twice a day for 5 days PRN Outbreak for 90 days   Yes ProviderCristina MD   ALPRAZolam (NIRAVAM) 1 MG disintegrating tablet Place 1 tablet on the tongue At Night As Needed for Anxiety.   Yes ProviderCristina MD   amitriptyline (ELAVIL) 10 MG tablet TAKE 2 TABLETS BY MOUTH AT BEDTIME FOR 30 DAYS 9/30/24  Yes ProviderCristina MD   cetirizine (zyrTEC) 10 MG tablet Take 1 tablet by mouth Daily. 2/18/22  Yes ProviderCristina MD   cholecalciferol (VITAMIN D3) 25 MCG (1000 UT) tablet Take 1 tablet by mouth Daily.   Yes Provider, MD Cristina   clindamycin (CLEOCIN T) 1 % lotion APPLY SMALL AMOUNT ONCE DAILY TO CHIN 8/11/22  Yes Provider, MD Cristina   cyclobenzaprine (FLEXERIL) 10 MG tablet TAKE 1 TABLET BY MOUTH THREE TIMES DAILY FOR UP TO 14 DAYS AS NEEDED FOR MUSCLE SPASMS 7/19/23  Yes ProviderCristina MD   dicyclomine (BENTYL) 20 MG tablet  8/2/24  Yes ProviderCristina MD   doxycycline (ADOXA) 100 MG tablet  4/15/23  Yes ProviderCristina MD   doxycycline (PERIOSTAT) 20 MG tablet 1 tablet. 8/16/24  Yes ProviderCristina MD   doxycycline (VIBRAMYICN) 100 MG tablet Take 1 tablet by mouth Every 12 (Twelve) Hours. 9/3/24  Yes ProviderCristina MD   eletriptan (RELPAX) 40 MG tablet Take one tablet by mouth at onset of migraine. May repeat in 2 hours if needed. Do not exceed 2 tablets in 24 hours. 10/18/18  Yes Chao Salas,  APRN   Emgality 120 MG/ML auto-injector pen  5/12/21  Yes Cristina Norris MD   EPINEPHrine (EPIPEN) 0.3 MG/0.3ML solution auto-injector injection EpiPen   Yes Cristina Norris MD   estradiol (ESTRACE) 1 MG tablet TAKE ONE TABLET BY MOUTH ONCE DAILY 12/12/22  Yes Cristina Norris MD   fluconazole (DIFLUCAN) 150 MG tablet 1 po once PRN  Yeast for 90 days   Yes Cristina Norris MD   lisinopril (PRINIVIL,ZESTRIL) 10 MG tablet TAKE 1 TABLET BY MOUTH ONCE DAILY FOR 30 DAYS 7/8/19  Yes Cristina Norris MD   meloxicam (MOBIC) 15 MG tablet Take 1 tablet by mouth Daily. 9/3/23  Yes Skyler Torres MD   metaxalone (SKELAXIN) 800 MG tablet Take 1 tablet by mouth 3 (Three) Times a Day. 9/3/23  Yes Skyler Torres MD   Multiple Vitamins-Minerals (MULTIVITAMIN PO) Take 1 tablet by mouth Daily.   Yes Cristina Norris MD   nystatin 287007 UNIT/GM topical powder APPLY TOPICALLY TWICE DAILY +++CONTACT [facility] FOR REFILL+++ 9/30/24  Yes Cristina Norris MD   nystatin-triamcinolone (MYCOLOG II) 067337-6.1 UNIT/GM-% cream APPLY SMALL AMOUNT UNDER BREASTS AND ABDOMEN EVERY EVENING 8/11/22  Yes Cristina Norris MD   omeprazole (PriLOSEC) 40 MG capsule Take 1 capsule by mouth Daily. 10/3/16  Yes Cristina Norris MD   ondansetron ODT (ZOFRAN-ODT) 8 MG disintegrating tablet TAKE ONE TABLET BY MOUTH AND allow TO dissolve ONCE DAILY AS NEEDED. CALL [facility] FOR REFILL 4/14/21  Yes Cristina Norris MD   phentermine (ADIPEX-P) 37.5 MG tablet Take 1 tablet by mouth Daily. 5/15/24  Yes Cristina Norris MD   phentermine 37.5 MG capsule Take 1 capsule by mouth Daily. 9/6/24  Yes Cristina Norris MD   prochlorperazine (COMPAZINE) 10 MG tablet TAKE ONE TABLET BY MOUTH THREE TIMES DAILY AS NEEDED CALL [facility] FOR REFILL 6/24/22  Yes Cristina Norris MD   promethazine-dextromethorphan (PROMETHAZINE-DM) 6.25-15 MG/5ML syrup TAKE 2.5 TO 5 ML BY MOUTH FOUR TIMES DAILY FOR UP  "TO 7 DAYS AS NEEDED FOR COUGH 2/14/23  Yes Cristina Norris MD   pseudoephedrine (SUDAFED) 30 MG tablet Every 6 (Six) Hours. 7/1/22  Yes Cristina Norris MD   rizatriptan (MAXALT) 10 MG tablet Take 1 tablet by mouth 1 (One) Time As Needed for Migraine. May repeat in 2 hours if needed 10/18/18  Yes Chao Salas APRN   simethicone (MYLICON) 80 MG chewable tablet  2/11/22  Yes Cristina Norris MD   triamcinolone (KENALOG) 0.147 MG/GM topical spray APPLY TO INFLAMMED SKIN AROUND STOMA SITE TWICE DAILY - NOT FOR CONTINUOUS USE. 4/7/23  Yes Cristina Norris MD   valACYclovir (VALTREX) 500 MG tablet Take 1 tablet by mouth Daily. 7/21/23  Yes Cristina Norris MD   [employer] Allergy 25 MG capsule  12/16/22  Yes Cristina Norris MD   HYDROcodone-acetaminophen (NORCO) 5-325 MG per tablet Take 1 tablet by mouth.  Patient not taking: Reported on 10/11/2024 7/19/23 10/11/24  Cristina Norris MD   hydrOXYzine (ATARAX) 25 MG tablet  8/11/22 10/11/24  Cristina Norris MD     _______________________________________    Current contraception: status post hysterectomy  History of abnormal Pap smear: no  Family history of uterine or ovarian cancer: no  Family History of colon cancer/colon polyps: no  History of abnormal mammogram: no  History of abnormal lipids: no    The following portions of the patient's history were reviewed and updated as appropriate: allergies, current medications, past family history, past medical history, past social history, past surgical history, and problem list.    Review of Systems    Pertinent items are noted in HPI.       Objective   Physical Exam    /88   Ht 160 cm (63\")   Wt 62.6 kg (138 lb)   LMP  (LMP Unknown)   BMI 24.45 kg/m²    BP Readings from Last 3 Encounters:   10/11/24 138/88   09/03/23 104/76   08/11/23 120/64      Wt Readings from Last 3 Encounters:   10/11/24 62.6 kg (138 lb)   09/03/23 65.8 kg (145 lb)   08/11/23 66.7 kg (147 lb)    " "    BMI: Estimated body mass index is 24.45 kg/m² as calculated from the following:    Height as of this encounter: 160 cm (63\").    Weight as of this encounter: 62.6 kg (138 lb).       General: alert, appears stated age, and cooperative   Heart: regular rate and rhythm, S1, S2 normal, no murmur, click, rub or gallop   Lungs: clear to auscultation bilaterally   Abdomen: soft, non-tender, without masses, no organomegaly   Breast: inspection negative, no nipple discharge or bleeding, no masses or nodularity palpable   External genitalia/Vulva: External genitalia including bartholin's glands, Urethra, [place]'s gland and urethra meatus are normal, Perineum, rectum and anus appear normal , and Bladder appears normal without significant prolapse    Vagina: normal mucosa, normal discharge   Cervix: absent    Uterus: absent    Adnexa: normal adnexa     As part of wellness and prevention, the following topics were discussed with the patient:  Encouraged self breast exam  Physical activity and regular exercised encouraged.         Problem List   Meds  History  Prep for Surg   Imagin}    Assessment:  Diagnoses and all orders for this visit:    1. Well female exam with routine gynecological exam (Primary)    2. Screening mammogram for breast cancer  -     Mammo Screening Digital Tomosynthesis Bilateral With CAD; Future    3. Screening for blood or protein in urine  -     POC Urinalysis Dipstick      Plan:  Return in 1 year (on 10/11/2025) for Annual exam.    Burak Morley MD  10/11/2024 13:29 EDT  "

## 2024-10-16 ENCOUNTER — OFFICE VISIT (OUTPATIENT)
Dept: SURGERY | Facility: CLINIC | Age: 59
End: 2024-10-16
Payer: COMMERCIAL

## 2024-10-16 VITALS
HEIGHT: 63 IN | SYSTOLIC BLOOD PRESSURE: 128 MMHG | WEIGHT: 139 LBS | BODY MASS INDEX: 24.63 KG/M2 | DIASTOLIC BLOOD PRESSURE: 74 MMHG

## 2024-10-16 DIAGNOSIS — K56.609 SMALL BOWEL OBSTRUCTION: ICD-10-CM

## 2024-10-16 DIAGNOSIS — Z93.2 ILEOSTOMY IN PLACE: Primary | ICD-10-CM

## 2024-10-16 NOTE — PROGRESS NOTES
Colorectal & General Surgery  Consultation    Patient: Gustavo Hernandez  YOB: 1965  MRN: [id number]      Assessment  Gustavo Hernandez is a 58 y.o. female with history of ulcerative colitis status post total proctocolectomy with end ileostomy who presents with chronic intermittent left lower quadrant pain.  I have reviewed the ultrasound of her abdomen as well as the CT scan of her abdomen pelvis.  There is no obvious surgical etiology of her pain.  She does not have a hernia, there is no obvious transition point related to a partial bowel obstruction, and there is no intra-abdominal fluid collection or mass.  I suspect that her pain could be due to adhesive disease.  She has experienced multiple bowel obstructions in the past.  We discussed that adhesiolysis for treatment of pain does not usually resolve pain, but instead create more pain.  At this time, since her pain seems to be improving and is less constant, I recommend continued conservative management.  If she develops worsening of her pain again, I have recommended undergoing a small bowel follow-through while she is in the midst of the pain.  This will elucidate if she has any significant adhesive disease that is causing partial obstruction that could be alleviated with adhesiolysis.  We had a very nice discussion and she is amenable to this plan.  She will follow-up on an as-needed basis.    Referring Provider: Scooby Woodard MD     Reason for Consultation: Left lower quadrant abdominal pain    History of Present Illness   Gustavo Hernandez is a 58 y.o. female who presents with left lower quadrant abdominal pain.  She says its intermittent at this point but previously was constant.  It is a deep and sharp pain.  Not necessarily associated with nausea, vomiting, or other obstructive symptoms.    Most recent colonoscopy: 2002    Past Medical History   Past Medical History:   Diagnosis Date    Abnormal cortisol level 03/2019    Abnormal CT scan 04/2022     There is mention of compression of the left renal vein, which could be consistent with nutcracker syndrome.    Abnormal serum thyroxine (T4) level 07/2020    Abnormality of left breast on screening mammogram 07/16/2022    Actinic keratosis     FOLLOWED BY DR. DEEDEE LUTHER    HASMUKH (acute kidney injury) 06/2021    Altered bowel elimination due to intestinal ostomy     Anemia     Microcytic anemia.    Anxiety     Arthralgia of right hand 02/11/2022    Asthma     Colon polyp 1994    Complex cyst of uterine adnexa 07/2002    COPD (chronic obstructive pulmonary disease)     COVID-19 virus infection 01/14/2022    Crohn's disease     Decreased bowel sounds     Dehydration 06/2015    Depression     Diplopia 07/2018    Esophageal reflux     Essential hypertension 01/14/2019    Excessive menstruation 2002    Exertional chest pain 01/18/2019    SEEN AT [facility]    Family history of premature coronary heart disease 01/14/2019    Gastroenteritis 06/19/2015    SEEN AT [facility]    Gastroesophageal reflux disease 11/08/2022    GERD (gastroesophageal reflux disease)     Hair loss 08/2019    Herpes     High risk medication use     History of MRSA infection     History of transfusion 2002    Hypertension     Hypertensive urgency 12/19/2018    SEEN AT [facility]    Hypertriglyceridemia     Ileostomy in place 10/24/2016    Impacted cerumen     Influenza A 07/22/2019    Insomnia 10/2018    Intestinal malabsorption     Iron deficiency anemia     Kidney stones     Macrocytosis     Menopausal flushing 11/08/2022    Microhematuria 05/2013    FOLLOWED BY DR. KULWINDER SANCHEZ    Migraine without status migrainosus, not intractable 04/26/2021    Migraines     MRSA infection     Nausea and vomiting 05/17/2022    Admitted, Nausea and vomiting, intractability of vomiting not specified, unspecified vomiting type. Chance.    Nutcracker phenomenon of renal vein     Osteopenia, senile 07/08/2022    Pagophagia 08/2019    Peptic ulceration      Peritoneal adhesions     PONV (postoperative nausea and vomiting)     Proctosigmoiditis 2001    REFRACTORY, ADMITTED TO [facility]    SBO (small bowel obstruction) 10/07/2019    ADMITTED TO [place]    SBO (small bowel obstruction) 2014    ADMITTED TO [facility]    SBO (small bowel obstruction) 2003    SBO (small bowel obstruction) 2019    SEEN AT [facility] ER    Seasonal allergies 2022    Seborrheic keratosis     FOLLOWED BY DR. DEEDEE LUTHER    Small bowel obstruction 10/07/2019    Toxic colitis 1999    ADMITTED TO [facility]    Ulcerative colitis     Ulcerative colitis     ulcerative colitis status post colectomy with ileostomy.    Vaginal atrophy     Vitamin D deficiency         Past Surgical History   Past Surgical History:   Procedure Laterality Date    APPENDECTOMY N/A 2001    DR. MARY KATE NEWELL  AT [facility]    BREAST BIOPSY      Left     SECTION N/A 1988     SECTION N/A 1993     SECTION      COLECTOMY TOTAL N/A 2001    Total abdominal colectomy with J-pouch with diverting loop ileostomy AND APPENDECTOMY, DR. MARY KATE NEWELL AT [facility]    COLONOSCOPY N/A 2001    PROCTOSIGMOIDITIS, PSEUDOPOLYP AT 20 CM, DR. JOHNNY HOLCOMB AT [facility]    CYSTOSCOPY RETROGRADE PYELOGRAM Left 2013    Dr. Cr Yee AT [facility]    D & C HYSTEROSCOPY N/A 2013    FOCAL CYSTIC ATROPHY, ECC BENIGN, DR. VISH CROWDER  AT [facility]    ENDOSCOPY N/A 2019    SMALL BOWEL ENTEROSCOPY, EDEMA IN STOMACH, CHRONIC GASTRITIS, PATH: MILD REACTIVE/CHEMICAL GASTROPATHY, DR. RUBEN MANCUSO AT [facility]    ENDOSCOPY N/A 2022    Z-line was found at 38 cm and the site of hiatal narrowing was found at 40 cm from the incisors. Normal; esophagus, duodenum, & entire examined stomach,ISH VASQUEZ MD.    EXPLORATORY LAPAROTOMY W/ BOWEL RESECTION  N/A 01/31/2019    EXPLORATORY LAPAROTOMY, PELVIC MASS EXCISION    FLEXIBLE SIGMOIDOSCOPY N/A 02/06/1997    (+) IBD, PATH: ACTIVE COLITIS, DR. JOHNNY HOLCOMB AT [facility]    HEMORRHOIDECTOMY  2006    HYSTERECTOMY N/A 08/28/2003    WITH BSO, DR. VISH CROWDER AT [facility]    ILEOSCOPY N/A 01/10/2002    WITH ANAL DILATION, (+) SEVERE POUCHITIS, DR. MARY KATE NEWELL  AT [facility]    ILEOSTOMY CLOSURE N/A 07/30/2001    DR. MARY KATE NEWELL AT [facility]    ILEOSTOMY REVISION N/A 01/25/2002    J POUCH REMOVAL WITH PERMANENT END ILEOSTOMYBURKE ILEOSTOMY CREATION, DR. MARY KATE NEWELL  AT [facility]    LAPAROSCOPIC CHOLECYSTECTOMY N/A 09/03/2010    w/ cholangiogram and adhesiolysis-Dr. Mary Kate Avila AT [facility]    LAPAROSCOPIC LYSIS OF ADHESIONS N/A 08/28/2003    EXPLORATORY LAPAROSCOPY WITH LYSIS, DR. MARY KATE NEWELL  AT [facility]    PELVIC LAPAROSCOPY N/A 01/31/2019    EXPLORATORY LAPAROSCOPY WITH PELVIC MASS EXCISION, DR. DOC BECERRIL AT [facility]    PERIPHERALLY INSERTED CENTRAL CATHETER INSERTION Right 02/15/2019    RIGHT SUBCLAVIAN, DR. CHARLES MARAVILLA AT [facility]    POUCHOSCOPY N/A 07/26/2001    POUCHOGRAM, WNL, DR. MARY KATE NEWELL  AT [facility]    SALPINGO OOPHORECTOMY Bilateral 08/28/2003    DR. VISH CROWDER  AT [facility]    UMBILICAL HERNIA REPAIR N/A 2000    UPPER GASTROINTESTINAL ENDOSCOPY N/A 09/01/2011    Normal esophagus, normal stomach, erythematous duodenopathy-Dr. Jai Khan    UPPER GASTROINTESTINAL ENDOSCOPY N/A 05/20/2010    Normal esophagus, normal stomach, normal duodneum-Dr. Jai Khan AT [facility]       Social History  Social History     Socioeconomic History    Marital status:      Spouse name: Denis Hernandez.    Number of children: 2    Years of education: 14   Tobacco Use    Smoking status: Former     Current packs/day: 0.00     Types: Cigarettes     Quit date: 1987     Years  since quittin.8     Passive exposure: Past    Smokeless tobacco: Former     Quit date:    Vaping Use    Vaping status: Never Used    Passive vaping exposure: Yes   Substance and Sexual Activity    Alcohol use: Yes     Comment: twice a year    Drug use: Yes    Sexual activity: Yes     Partners: Male     Birth control/protection: None, Hysterectomy     Comment: hysterectomy ,  to Denis Hernandez.       Family History  Family History   Problem Relation Age of Onset    Mental illness Mother     Heart disease Mother     Diabetes Mother     Hypertension Mother     Arthritis Mother         RHEUMATOID    Colon polyps Mother     Rheum arthritis Mother     Hyperlipidemia Mother     Colonic polyp Mother     Asthma Father     Heart disease Father     Hypertension Father     Stroke Father     Early death Father         Age 45    Alcohol abuse Brother         Eneachal    Arthritis Brother     Osteoporosis Maternal Grandmother     Colon cancer Paternal Grandmother         unsure of age    Osteoporosis Maternal Aunt     Colon cancer Paternal Aunt     Colon cancer Cousin     Breast cancer Neg Hx     Ovarian cancer Neg Hx     Uterine cancer Neg Hx     Deep vein thrombosis Neg Hx     Pulmonary embolism Neg Hx     Melanoma Neg Hx     Prostate cancer Neg Hx        Colorectal cancer family history: Cousin and paternal aunt    Review of Systems  Negative except as documented in the HPI.     Allergies  Allergies   Allergen Reactions    Iodinated Contrast Media Anaphylaxis    Iodine Anaphylaxis    Metoclopramide Anxiety, Dizziness, Nausea Only, Rash and Palpitations     AKA REGLAN    Propoxyphene Nausea Only, Anxiety, Rash and Dizziness     AKA DARVOCET       Medications    Current Outpatient Medications:     acyclovir (ZOVIRAX) 400 MG tablet, 1 tablet Orally Twice a day for 5 days PRN Outbreak for 90 days, Disp: , Rfl:     ALPRAZolam (NIRAVAM) 1 MG disintegrating tablet, Place 1 tablet on the tongue At Night As Needed for  Anxiety., Disp: , Rfl:     amitriptyline (ELAVIL) 10 MG tablet, TAKE 2 TABLETS BY MOUTH AT BEDTIME FOR 30 DAYS, Disp: , Rfl:     cetirizine (zyrTEC) 10 MG tablet, Take 1 tablet by mouth Daily., Disp: , Rfl:     cholecalciferol (VITAMIN D3) 25 MCG (1000 UT) tablet, Take 1 tablet by mouth Daily., Disp: , Rfl:     clindamycin (CLEOCIN T) 1 % lotion, APPLY SMALL AMOUNT ONCE DAILY TO CHIN, Disp: , Rfl:     cyclobenzaprine (FLEXERIL) 10 MG tablet, TAKE 1 TABLET BY MOUTH THREE TIMES DAILY FOR UP TO 14 DAYS AS NEEDED FOR MUSCLE SPASMS, Disp: , Rfl:     dicyclomine (BENTYL) 20 MG tablet, , Disp: , Rfl:     doxycycline (ADOXA) 100 MG tablet, , Disp: , Rfl:     doxycycline (PERIOSTAT) 20 MG tablet, 1 tablet., Disp: , Rfl:     eletriptan (RELPAX) 40 MG tablet, Take one tablet by mouth at onset of migraine. May repeat in 2 hours if needed. Do not exceed 2 tablets in 24 hours., Disp: 9 tablet, Rfl: 5    Emgality 120 MG/ML auto-injector pen, , Disp: , Rfl:     EPINEPHrine (EPIPEN) 0.3 MG/0.3ML solution auto-injector injection, EpiPen, Disp: , Rfl:     estradiol (ESTRACE) 1 MG tablet, TAKE ONE TABLET BY MOUTH ONCE DAILY, Disp: , Rfl:     fluconazole (DIFLUCAN) 150 MG tablet, 1 po once PRN  Yeast for 90 days, Disp: , Rfl:     lisinopril (PRINIVIL,ZESTRIL) 10 MG tablet, TAKE 1 TABLET BY MOUTH ONCE DAILY FOR 30 DAYS, Disp: , Rfl:     meloxicam (MOBIC) 15 MG tablet, Take 1 tablet by mouth Daily., Disp: 7 tablet, Rfl: 0    metaxalone (SKELAXIN) 800 MG tablet, Take 1 tablet by mouth 3 (Three) Times a Day., Disp: 21 tablet, Rfl: 0    Multiple Vitamins-Minerals (MULTIVITAMIN PO), Take 1 tablet by mouth Daily., Disp: , Rfl:     nystatin 555221 UNIT/GM topical powder, APPLY TOPICALLY TWICE DAILY +++CONTACT [facility] FOR REFILL+++, Disp: , Rfl:     nystatin-triamcinolone (MYCOLOG II) 564666-0.1 UNIT/GM-% cream, APPLY SMALL AMOUNT UNDER BREASTS AND ABDOMEN EVERY EVENING, Disp: , Rfl:     omeprazole (PriLOSEC) 40 MG capsule, Take 1 capsule by  mouth Daily., Disp: , Rfl:     ondansetron ODT (ZOFRAN-ODT) 8 MG disintegrating tablet, TAKE ONE TABLET BY MOUTH AND allow TO dissolve ONCE DAILY AS NEEDED. CALL [facility] FOR REFILL, Disp: , Rfl:     phentermine 37.5 MG capsule, Take 1 capsule by mouth Daily., Disp: , Rfl:     prochlorperazine (COMPAZINE) 10 MG tablet, TAKE ONE TABLET BY MOUTH THREE TIMES DAILY AS NEEDED CALL [facility] FOR REFILL, Disp: , Rfl:     pseudoephedrine (SUDAFED) 30 MG tablet, Every 6 (Six) Hours., Disp: , Rfl:     rizatriptan (MAXALT) 10 MG tablet, Take 1 tablet by mouth 1 (One) Time As Needed for Migraine. May repeat in 2 hours if needed, Disp: 18 tablet, Rfl: 5    simethicone (MYLICON) 80 MG chewable tablet, , Disp: , Rfl:     triamcinolone (KENALOG) 0.147 MG/GM topical spray, APPLY TO INFLAMMED SKIN AROUND STOMA SITE TWICE DAILY - NOT FOR CONTINUOUS USE., Disp: , Rfl:     valACYclovir (VALTREX) 500 MG tablet, Take 1 tablet by mouth Daily., Disp: , Rfl:     Vital Signs  Vitals:    10/16/24 1019   BP: 128/74        Physical Exam  Constitutional: Resting comfortably, no acute distress  Neck: Supple, trachea midline  Respiratory: No increased work of breathing, Symmetric excursion  Cardiovascular: Well pefursed, no jugular venous distention evident   Abdominal: Ileostomy in place.  Midline incision well-healed.  Soft, non-tender, non-distended  Lymphatics: No cervical or suprascapular adenopathy  Skin: Warm, dry, no rash on visualized skin surfaces  Musculoskeletal: Symmetric strength, no obvious gross abnormalities  Psychiatric: Alert and oriented ×3, normal affect   BMI is within normal parameters. No other follow-up for BMI required.      Laboratory Results  I have personally reviewed CBC with WC 8, hemoglobin 14, platelets 249.  Lipase 41.  CMP with creatinine 1.0, albumin 4.2.    Radiology  I have personally reviewed CT scan of the abdomen pelvis demonstrates no significant intra-abdominal abnormalities.  Evidence of prior small  bowel resection noted in the mid small bowel.  End ileostomy.  Surgical absence of colon and rectum.  No abdominal wall hernias.    Also personally reviewed the ultrasound of her abdomen which demonstrates no evidence of hernia.         Felipe Marroquin MD  Colorectal & General Surgery  [facility]    [address], [address]  [place], [place], [address]  P: [phone]  F: [phone]

## 2024-10-18 ENCOUNTER — PATIENT ROUNDING (BHMG ONLY) (OUTPATIENT)
Dept: SURGERY | Facility: CLINIC | Age: 59
End: 2024-10-18
Payer: COMMERCIAL

## 2024-10-18 NOTE — PROGRESS NOTES
October 18, 2024    Hello, may I speak with Gustavo Hernandez?    My name is Rodrigue      I am  with [facility]  [address]  [place] [address] [phone].    Before we get started may I verify your date of birth? 1965    I am calling to officially welcome you to our practice and ask about your recent visit. Is this a good time to talk? yes    Tell me about your visit with us. What things went well?  everything went well.        We're always looking for ways to make our patients' experiences even better. Do you have recommendations on ways we may improve?  no    Overall were you satisfied with your first visit to our practice? yes       I appreciate you taking the time to speak with me today. Is there anything else I can do for you? no      Thank you, and have a great day.

## 2025-04-13 ENCOUNTER — APPOINTMENT (OUTPATIENT)
Dept: GENERAL RADIOLOGY | Facility: HOSPITAL | Age: 60
End: 2025-04-13
Payer: COMMERCIAL

## 2025-04-13 ENCOUNTER — HOSPITAL ENCOUNTER (EMERGENCY)
Facility: HOSPITAL | Age: 60
Discharge: HOME | End: 2025-04-13
Attending: EMERGENCY MEDICINE | Admitting: EMERGENCY MEDICINE
Payer: COMMERCIAL

## 2025-04-13 VITALS
HEART RATE: 76 BPM | TEMPERATURE: 98.8 F | OXYGEN SATURATION: 94 % | SYSTOLIC BLOOD PRESSURE: 122 MMHG | HEIGHT: 63 IN | RESPIRATION RATE: 16 BRPM | WEIGHT: 140 LBS | DIASTOLIC BLOOD PRESSURE: 84 MMHG | BODY MASS INDEX: 24.8 KG/M2

## 2025-04-13 DIAGNOSIS — R07.89 ATYPICAL CHEST PAIN: Primary | ICD-10-CM

## 2025-04-13 LAB
ALBUMIN SERPL-MCNC: 3.9 G/DL (ref 3.5–5.2)
ALBUMIN/GLOB SERPL: 1.4 G/DL
ALP SERPL-CCNC: 43 U/L (ref 39–117)
ALT SERPL W P-5'-P-CCNC: 12 U/L (ref 1–33)
ANION GAP SERPL CALCULATED.3IONS-SCNC: 15.8 MMOL/L (ref 5–15)
AST SERPL-CCNC: 17 U/L (ref 1–32)
BASOPHILS # BLD AUTO: 0.1 10*3/MM3 (ref 0–0.2)
BASOPHILS NFR BLD AUTO: 1.1 % (ref 0–1.5)
BILIRUB SERPL-MCNC: 0.2 MG/DL (ref 0–1.2)
BUN SERPL-MCNC: 22 MG/DL (ref 6–20)
BUN/CREAT SERPL: 20.8 (ref 7–25)
CALCIUM SPEC-SCNC: 9.1 MG/DL (ref 8.6–10.5)
CHLORIDE SERPL-SCNC: 105 MMOL/L (ref 98–107)
CO2 SERPL-SCNC: 18.2 MMOL/L (ref 22–29)
CREAT SERPL-MCNC: 1.06 MG/DL (ref 0.57–1)
D DIMER PPP FEU-MCNC: <0.27 MCGFEU/ML (ref 0–0.58)
DEPRECATED RDW RBC AUTO: 45.1 FL (ref 37–54)
EGFRCR SERPLBLD CKD-EPI 2021: 61 ML/MIN/1.73
EOSINOPHIL # BLD AUTO: 0.24 10*3/MM3 (ref 0–0.4)
EOSINOPHIL NFR BLD AUTO: 2.6 % (ref 0.3–6.2)
ERYTHROCYTE [DISTWIDTH] IN BLOOD BY AUTOMATED COUNT: 12.6 % (ref 12.3–15.4)
GLOBULIN UR ELPH-MCNC: 2.7 GM/DL
GLUCOSE SERPL-MCNC: 93 MG/DL (ref 65–99)
HCT VFR BLD AUTO: 38.1 % (ref 34–46.6)
HGB BLD-MCNC: 13.1 G/DL (ref 12–15.9)
HOLD SPECIMEN: NORMAL
HOLD SPECIMEN: NORMAL
IMM GRANULOCYTES # BLD AUTO: 0.07 10*3/MM3 (ref 0–0.05)
IMM GRANULOCYTES NFR BLD AUTO: 0.8 % (ref 0–0.5)
LYMPHOCYTES # BLD AUTO: 3.98 10*3/MM3 (ref 0.7–3.1)
LYMPHOCYTES NFR BLD AUTO: 42.8 % (ref 19.6–45.3)
MCH RBC QN AUTO: 33.6 PG (ref 26.6–33)
MCHC RBC AUTO-ENTMCNC: 34.4 G/DL (ref 31.5–35.7)
MCV RBC AUTO: 97.7 FL (ref 79–97)
MONOCYTES # BLD AUTO: 0.73 10*3/MM3 (ref 0.1–0.9)
MONOCYTES NFR BLD AUTO: 7.8 % (ref 5–12)
NEUTROPHILS NFR BLD AUTO: 4.18 10*3/MM3 (ref 1.7–7)
NEUTROPHILS NFR BLD AUTO: 44.9 % (ref 42.7–76)
NRBC BLD AUTO-RTO: 0 /100 WBC (ref 0–0.2)
NT-PROBNP SERPL-MCNC: 37.1 PG/ML (ref 0–900)
PLATELET # BLD AUTO: 302 10*3/MM3 (ref 140–450)
PMV BLD AUTO: 9.3 FL (ref 6–12)
POTASSIUM SERPL-SCNC: 4 MMOL/L (ref 3.5–5.2)
PROT SERPL-MCNC: 6.6 G/DL (ref 6–8.5)
RBC # BLD AUTO: 3.9 10*6/MM3 (ref 3.77–5.28)
SODIUM SERPL-SCNC: 139 MMOL/L (ref 136–145)
TROPONIN T SERPL HS-MCNC: <6 NG/L
WBC NRBC COR # BLD AUTO: 9.3 10*3/MM3 (ref 3.4–10.8)
WHOLE BLOOD HOLD COAG: NORMAL
WHOLE BLOOD HOLD SPECIMEN: NORMAL

## 2025-04-13 PROCEDURE — 25010000002 KETOROLAC TROMETHAMINE PER 15 MG: Performed by: EMERGENCY MEDICINE

## 2025-04-13 PROCEDURE — 99284 EMERGENCY DEPT VISIT MOD MDM: CPT | Performed by: EMERGENCY MEDICINE

## 2025-04-13 PROCEDURE — 83880 ASSAY OF NATRIURETIC PEPTIDE: CPT | Performed by: EMERGENCY MEDICINE

## 2025-04-13 PROCEDURE — 71045 X-RAY EXAM CHEST 1 VIEW: CPT

## 2025-04-13 PROCEDURE — 93005 ELECTROCARDIOGRAM TRACING: CPT

## 2025-04-13 PROCEDURE — 80053 COMPREHEN METABOLIC PANEL: CPT | Performed by: EMERGENCY MEDICINE

## 2025-04-13 PROCEDURE — 93005 ELECTROCARDIOGRAM TRACING: CPT | Performed by: EMERGENCY MEDICINE

## 2025-04-13 PROCEDURE — 85379 FIBRIN DEGRADATION QUANT: CPT | Performed by: EMERGENCY MEDICINE

## 2025-04-13 PROCEDURE — 84484 ASSAY OF TROPONIN QUANT: CPT | Performed by: EMERGENCY MEDICINE

## 2025-04-13 PROCEDURE — 85025 COMPLETE CBC W/AUTO DIFF WBC: CPT | Performed by: EMERGENCY MEDICINE

## 2025-04-13 PROCEDURE — 96374 THER/PROPH/DIAG INJ IV PUSH: CPT

## 2025-04-13 RX ORDER — KETOROLAC TROMETHAMINE 30 MG/ML
15 INJECTION, SOLUTION INTRAMUSCULAR; INTRAVENOUS ONCE
Status: COMPLETED | OUTPATIENT
Start: 2025-04-13 | End: 2025-04-13

## 2025-04-13 RX ORDER — ASPIRIN 325 MG
325 TABLET ORAL ONCE
Status: COMPLETED | OUTPATIENT
Start: 2025-04-13 | End: 2025-04-13

## 2025-04-13 RX ORDER — KETOROLAC TROMETHAMINE 10 MG/1
10 TABLET, FILM COATED ORAL EVERY 6 HOURS PRN
Qty: 20 TABLET | Refills: 0 | Status: SHIPPED | OUTPATIENT
Start: 2025-04-13

## 2025-04-13 RX ORDER — SODIUM CHLORIDE 0.9 % (FLUSH) 0.9 %
10 SYRINGE (ML) INJECTION AS NEEDED
Status: DISCONTINUED | OUTPATIENT
Start: 2025-04-13 | End: 2025-04-14 | Stop reason: HOSPADM

## 2025-04-13 RX ADMIN — ASPIRIN 325 MG: 325 TABLET ORAL at 21:42

## 2025-04-13 RX ADMIN — KETOROLAC TROMETHAMINE 15 MG: 30 INJECTION, SOLUTION INTRAMUSCULAR; INTRAVENOUS at 23:10

## 2025-04-14 LAB
QT INTERVAL: 361 MS
QTC INTERVAL: 429 MS

## 2025-04-14 NOTE — DISCHARGE INSTRUCTIONS
Take ketorolac as prescribed.  Call the patient to  In the morning for follow-up appointment next week.

## 2025-04-14 NOTE — ED PROVIDER NOTES
Subjective   History of Present Illness    Chief complaint: Chest pain    Location: Left-sided radiating to the neck and left shoulder    Quality/Severity: Achy, 8/10 at its worst, 6/10 currently    Timing/Onset/Duration: 10:30 AM this morning, constant    Modifying Factors: No modifying factors.  Patient took Aleve and Flexeril without relief    Associated Symptoms: No headache.  No fever chills or cough.  No sore throat earache or nasal congestion.  No shortness of breath.  No diaphoresis.  No nausea or vomiting.  No abdominal pain.  No diarrhea or burning when she urinates.  No palpitations.    Narrative: This 59-year-old white female who does not smoke, has a history of hypertension, is not diabetic, does have dyslipidemia, whose father had to have a heart transplant in his 40s, previous never had a stress test or cardiac catheterization, he has not had an MI, who is not diabetic, has hypertension, has dyslipidemia, does not smoke, has never had a blood clot in the leg or lung, no recent long trips or surgeries, no bleeding or clotting problems, not on hormones, no history of cancer presents with chest pain while she was at [employer] this started at 10:30 AM.  Has been having intermittent pain for the last 4 weeks.  Patient has not seen a provider for this complaint.    PCP:Ebony Canas APRN      Review of Systems   Constitutional:  Negative for chills and fever.   HENT:  Negative for congestion, ear pain and sore throat.    Respiratory:  Negative for cough and shortness of breath.    Cardiovascular:  Positive for chest pain. Negative for palpitations.   Gastrointestinal:  Negative for abdominal pain, diarrhea, nausea and vomiting.   Genitourinary:  Negative for flank pain.   Musculoskeletal:  Negative for back pain.   Skin:  Negative for rash.   Neurological:  Negative for headaches.       Past Medical History:   Diagnosis Date    Abnormal cortisol level 03/2019    Abnormal CT scan 04/2022    There is mention of  compression of the left renal vein, which could be consistent with nutcracker syndrome.    Abnormal serum thyroxine (T4) level 07/2020    Abnormality of left breast on screening mammogram 07/16/2022    Actinic keratosis     FOLLOWED BY DR. DEEDEE LUTHER    HASMUKH (acute kidney injury) 06/2021    Altered bowel elimination due to intestinal ostomy     Anemia     Microcytic anemia.    Anxiety     Arthralgia of right hand 02/11/2022    Asthma     Colon polyp 1994    Complex cyst of uterine adnexa 07/2002    COPD (chronic obstructive pulmonary disease)     COVID-19 virus infection 01/14/2022    Crohn's disease     Decreased bowel sounds     Dehydration 06/2015    Depression     Diplopia 07/2018    Esophageal reflux     Essential hypertension 01/14/2019    Excessive menstruation 2002    Exertional chest pain 01/18/2019    SEEN AT [facility]    Family history of premature coronary heart disease 01/14/2019    Gastroenteritis 06/19/2015    SEEN AT [facility]    Gastroesophageal reflux disease 11/08/2022    GERD (gastroesophageal reflux disease)     Hair loss 08/2019    Herpes     High risk medication use     History of MRSA infection     History of transfusion 2002    Hypertension     Hypertensive urgency 12/19/2018    SEEN AT [facility]    Hypertriglyceridemia     Ileostomy in place 10/24/2016    Impacted cerumen     Influenza A 07/22/2019    Insomnia 10/2018    Intestinal malabsorption     Iron deficiency anemia     Kidney stones     Macrocytosis     Menopausal flushing 11/08/2022    Microhematuria 05/2013    FOLLOWED BY DR. KULWINDER SANCHEZ    Migraine without status migrainosus, not intractable 04/26/2021    Migraines     MRSA infection     Nausea and vomiting 05/17/2022    Admitted, Nausea and vomiting, intractability of vomiting not specified, unspecified vomiting type. [place].    Nutcracker phenomenon of renal vein     Osteopenia, senile 07/08/2022    Pagophagia 08/2019    Peptic ulceration     Peritoneal adhesions      PONV (postoperative nausea and vomiting) 1994    Proctosigmoiditis 2001    REFRACTORY, ADMITTED TO [facility]    SBO (small bowel obstruction) 10/07/2019    ADMITTED TO [facility]    SBO (small bowel obstruction) 2014    ADMITTED TO [facility]    SBO (small bowel obstruction) 2003    SBO (small bowel obstruction) 2019    SEEN AT [facility] ER    Seasonal allergies 2022    Seborrheic keratosis     FOLLOWED BY DR. DEEDEE LUTHER    Small bowel obstruction 10/07/2019    Toxic colitis 1999    ADMITTED TO [facility] [place]    Ulcerative colitis     Ulcerative colitis     ulcerative colitis status post colectomy with ileostomy.    Vaginal atrophy     Vitamin D deficiency        Allergies   Allergen Reactions    Iodinated Contrast Media Anaphylaxis    Iodine Anaphylaxis    Metoclopramide Anxiety, Dizziness, Nausea Only, Rash and Palpitations     AKA REGLAN    Propoxyphene Nausea Only, Anxiety, Rash and Dizziness     AKA DARVOCET       Past Surgical History:   Procedure Laterality Date    APPENDECTOMY N/A 2001    DR. MARY KATE NEWELL  AT [facility]    BREAST BIOPSY      Left     SECTION N/A 1988     SECTION N/A 1993     SECTION      COLECTOMY TOTAL N/A 2001    Total abdominal colectomy with J-pouch with diverting loop ileostomy AND APPENDECTOMY, DR. MARY KATE NEWELL AT [facility]    COLONOSCOPY N/A 2001    PROCTOSIGMOIDITIS, PSEUDOPOLYP AT 20 CM, DR. JOHNNY HOLCOMB AT [facility]    CYSTOSCOPY RETROGRADE PYELOGRAM Left 2013    Dr. Cr Yee AT [facility]    D & C HYSTEROSCOPY N/A 2013    FOCAL CYSTIC ATROPHY, ECC BENIGN, DR. VISH CROWDER  AT [facility]    ENDOSCOPY N/A 2019    SMALL BOWEL ENTEROSCOPY, EDEMA IN STOMACH, CHRONIC GASTRITIS, PATH: MILD REACTIVE/CHEMICAL GASTROPATHY, DR. RUBEN MANCUSO AT [facility]    ENDOSCOPY N/A 2022    Z-line  was found at 38 cm and the site of hiatal narrowing was found at 40 cm from the incisors. Normal; esophagus, duodenum, & entire examined stomach,ISH VASQUEZ MD.    EXPLORATORY LAPAROTOMY W/ BOWEL RESECTION N/A 01/31/2019    EXPLORATORY LAPAROTOMY, PELVIC MASS EXCISION    FLEXIBLE SIGMOIDOSCOPY N/A 02/06/1997    (+) IBD, PATH: ACTIVE COLITIS, DR. JOHNNY HOLCOMB AT [facility] [place] [facility]    HEMORRHOIDECTOMY  2006    HYSTERECTOMY N/A 08/28/2003    WITH BSO, DR. VISH CROWDER AT [facility] [place] [facility]    ILEOSCOPY N/A 01/10/2002    WITH ANAL DILATION, (+) SEVERE POUCHITIS, DR. MARY KATE NEWELL  AT [facility] [place] [facility]    ILEOSTOMY CLOSURE N/A 07/30/2001    DR. MARY KATE NEWELL AT [facility] [place] [facility]    ILEOSTOMY REVISION N/A 01/25/2002    J POUCH REMOVAL WITH PERMANENT END ILEOSTOMYBURKE ILEOSTOMY CREATION, DR. MARY KATE NEWELL  AT [facility] [place] [facility]    LAPAROSCOPIC CHOLECYSTECTOMY N/A 09/03/2010    w/ cholangiogram and adhesiolysis-Dr. Mary Kate Avila AT [facility]    LAPAROSCOPIC LYSIS OF ADHESIONS N/A 08/28/2003    EXPLORATORY LAPAROSCOPY WITH LYSIS, DR. MARY KATE NEWELL  AT [facility]    PELVIC LAPAROSCOPY N/A 01/31/2019    EXPLORATORY LAPAROSCOPY WITH PELVIC MASS EXCISION, DR. DOC BECERRIL AT [place]    PERIPHERALLY INSERTED CENTRAL CATHETER INSERTION Right 02/15/2019    RIGHT SUBCLAVIAN, DR. CHARLES MARAVILLA AT [facility]    POUCHOSCOPY N/A 07/26/2001    POUCHOGRAM, WNL, DR. MARY KATE NEWELL  AT [facility]    SALPINGO OOPHORECTOMY Bilateral 08/28/2003    DR. VISH CROWDER  AT [facility]    UMBILICAL HERNIA REPAIR N/A 2000    UPPER GASTROINTESTINAL ENDOSCOPY N/A 09/01/2011    Normal esophagus, normal stomach, erythematous duodenopathy-Dr. Jai Khan    UPPER GASTROINTESTINAL ENDOSCOPY N/A 05/20/2010    Normal esophagus, normal stomach, normal duodneum-Dr. Jai Khan AT [facility]       Family History   Problem Relation Age of Onset    Mental illness Mother      Heart disease Mother     Diabetes Mother     Hypertension Mother     Arthritis Mother         RHEUMATOID    Colon polyps Mother     Rheum arthritis Mother     Hyperlipidemia Mother     Colonic polyp Mother     Asthma Father     Heart disease Father     Hypertension Father     Stroke Father     Early death Father         Age 45    Alcohol abuse Brother         Eneachal    Arthritis Brother     Osteoporosis Maternal Grandmother     Colon cancer Paternal Grandmother         unsure of age    Osteoporosis Maternal Aunt     Colon cancer Paternal Aunt     Colon cancer Cousin     Breast cancer Neg Hx     Ovarian cancer Neg Hx     Uterine cancer Neg Hx     Deep vein thrombosis Neg Hx     Pulmonary embolism Neg Hx     Melanoma Neg Hx     Prostate cancer Neg Hx        Social History     Socioeconomic History    Marital status:      Spouse name: Denis Hernandez.    Number of children: 2    Years of education: 14   Tobacco Use    Smoking status: Former     Current packs/day: 0.00     Types: Cigarettes     Quit date:      Years since quittin.3     Passive exposure: Past    Smokeless tobacco: Former     Quit date:    Vaping Use    Vaping status: Never Used    Passive vaping exposure: Yes   Substance and Sexual Activity    Alcohol use: Yes    Drug use: Never    Sexual activity: Yes     Partners: Male     Birth control/protection: None, Hysterectomy     Comment: hysterectomy ,  to Denis Hernandez.           Objective   Physical Exam  Vitals (The temperature is 98.8 °F, pulse 80, respirations 16, /87, room air pulse ox 96%.) and nursing note reviewed.   Constitutional:       Appearance: She is well-developed.   HENT:      Head: Normocephalic and atraumatic.   Cardiovascular:      Rate and Rhythm: Normal rate and regular rhythm.      Heart sounds: No murmur heard.     No friction rub. No gallop.   Pulmonary:      Effort: Pulmonary effort is normal.      Breath sounds: Normal breath sounds.   Abdominal:       General: Bowel sounds are normal.      Palpations: Abdomen is soft. There is no mass.      Tenderness: There is no abdominal tenderness. There is no guarding or rebound.   Musculoskeletal:      Cervical back: Normal range of motion and neck supple.      Right lower leg: No edema.      Left lower leg: No edema.   Skin:     General: Skin is warm and dry.   Neurological:      General: No focal deficit present.      Mental Status: She is alert and oriented to person, place, and time.         Procedures           ED Course  ED Course as of 04/13/25 2303   Sun Apr 13, 2025 2120 The CBC is unremarkable [RC]   2125 The CBC is unremarkable. [RC]   2204 The BUN is 22, creatinine is 1.06, CO2 is 18, GFR 61, CMP is otherwise unremarkable. [RC]   2204 The high-sensitivity troponin is less than 6 and normal [RC]   2204 The D-dimer is less than 0.27 and normal [RC]   2204 The proBNP is 37 and normal. [RC]   2205 The CBC is unremarkable. [RC]      ED Course User Index  [RC] Mango Kulkarni MD        21:10 EDT, 04/13/25:  The EKG was obtained at 2046 and read by me at 2048.  EKG shows a normal sinus rhythm with rate 85.  There is a normal axis with no hypertrophy.  The IL, QRS, QT intervals are unremarkable.  There is no ectopy.  There is no acute ST elevation or depression.    23:03 EDT, 04/13/25:  The patient was reassessed.  She has no new complaints.  Her pain has been present since 1030 this morning, over 4 hours.  Her troponin is negative.  There is no evidence of pulmonary embolism.  The chest x-ray is unremarkable.  The blood work is otherwise unremarkable.  The patient will be sent home with a prescription for ketorolac.  The patient should call the patient connection line in the morning for appointment with local cardiology this week.  The patient should return to the emergency department there is worsening pain, shortness of breath, worsening way at all.  All the patient's questions answered she will be discharged  in good condition.  Her provisional diagnosis is atypical chest pain.                                               Medical Decision Making  Amount and/or Complexity of Data Reviewed  Labs: ordered.  Radiology: ordered.  ECG/medicine tests: ordered.    Risk  OTC drugs.  Prescription drug management.        Final diagnoses:   Atypical chest pain       ED Disposition  ED Disposition       None            No follow-up provider specified.       Medication List      No changes were made to your prescriptions during this visit.       XR Chest 1 View    (Results Pending)     Labs Reviewed   RAINBOW DRAW    Narrative:     The following orders were created for panel order [place] Draw.  Procedure                               Abnormality         Status                     ---------                               -----------         ------                     Green Top (Gel)[[id number]]                                                             Lavender Top[[id number]]                                                                Gold Top - SST[[id number]]                                                              Light Blue Top[[id number]]                                                                Please view results for these tests on the individual orders.   COMPREHENSIVE METABOLIC PANEL   TROPONIN   CBC WITH AUTO DIFFERENTIAL   BNP (IN-HOUSE)   D-DIMER, QUANTITATIVE   CBC AND DIFFERENTIAL    Narrative:     The following orders were created for panel order CBC & Differential.  Procedure                               Abnormality         Status                     ---------                               -----------         ------                     CBC Auto Differential[[id number]]                                                         Please view results for these tests on the individual orders.   GREEN TOP   LAVENDER TOP   GOLD TOP - SST   LIGHT BLUE TOP     No results found.    Final diagnoses:   None         ED  Medications:  Medications   sodium chloride 0.9 % flush 10 mL (has no administration in time range)   aspirin tablet 325 mg (has no administration in time range)       New Medications:     Medication List        ASK your doctor about these medications      acyclovir 400 MG tablet  Commonly known as: ZOVIRAX     ALPRAZolam 1 MG disintegrating tablet  Commonly known as: NIRAVAM     amitriptyline 10 MG tablet  Commonly known as: ELAVIL     cetirizine 10 MG tablet  Commonly known as: zyrTEC     cholecalciferol 25 MCG (1000 UT) tablet  Commonly known as: VITAMIN D3     clindamycin 1 % lotion  Commonly known as: CLEOCIN T     cyclobenzaprine 10 MG tablet  Commonly known as: FLEXERIL     dicyclomine 20 MG tablet  Commonly known as: BENTYL     doxycycline 100 MG tablet  Commonly known as: ADOXA     doxycycline 20 MG tablet  Commonly known as: PERIOSTAT     eletriptan 40 MG tablet  Commonly known as: RELPAX  Take one tablet by mouth at onset of migraine. May repeat in 2 hours if needed. Do not exceed 2 tablets in 24 hours.     Emgality 120 MG/ML auto-injector pen  Generic drug: galcanezumab-gnlm     EPINEPHrine 0.3 MG/0.3ML solution auto-injector injection  Commonly known as: EPIPEN     estradiol 1 MG tablet  Commonly known as: ESTRACE     fluconazole 150 MG tablet  Commonly known as: DIFLUCAN     lisinopril 10 MG tablet  Commonly known as: PRINIVIL,ZESTRIL     meloxicam 15 MG tablet  Commonly known as: MOBIC  Take 1 tablet by mouth Daily.     metaxalone 800 MG tablet  Commonly known as: SKELAXIN  Take 1 tablet by mouth 3 (Three) Times a Day.     multivitamin tablet tablet  Commonly known as: THERAGRAN     nystatin 577088 UNIT/GM topical powder  Generic drug: nystatin     nystatin-triamcinolone 094048-7.1 UNIT/GM-% cream  Commonly known as: MYCOLOG II     omeprazole 40 MG capsule  Commonly known as: priLOSEC     ondansetron ODT 8 MG disintegrating tablet  Commonly known as: ZOFRAN-ODT     phentermine 37.5 MG capsule      prochlorperazine 10 MG tablet  Commonly known as: COMPAZINE     pseudoephedrine 30 MG tablet  Commonly known as: SUDAFED     rizatriptan 10 MG tablet  Commonly known as: MAXALT  Take 1 tablet by mouth 1 (One) Time As Needed for Migraine. May repeat in 2 hours if needed     simethicone 80 MG chewable tablet  Commonly known as: MYLICON     triamcinolone 0.147 MG/GM topical spray  Commonly known as: KENALOG     valACYclovir 500 MG tablet  Commonly known as: VALTREX              Stopped Medications:     Medication List        ASK your doctor about these medications      acyclovir 400 MG tablet  Commonly known as: ZOVIRAX     ALPRAZolam 1 MG disintegrating tablet  Commonly known as: NIRAVAM     amitriptyline 10 MG tablet  Commonly known as: ELAVIL     cetirizine 10 MG tablet  Commonly known as: zyrTEC     cholecalciferol 25 MCG (1000 UT) tablet  Commonly known as: VITAMIN D3     clindamycin 1 % lotion  Commonly known as: CLEOCIN T     cyclobenzaprine 10 MG tablet  Commonly known as: FLEXERIL     dicyclomine 20 MG tablet  Commonly known as: BENTYL     doxycycline 100 MG tablet  Commonly known as: ADOXA     doxycycline 20 MG tablet  Commonly known as: PERIOSTAT     eletriptan 40 MG tablet  Commonly known as: RELPAX  Take one tablet by mouth at onset of migraine. May repeat in 2 hours if needed. Do not exceed 2 tablets in 24 hours.     Emgality 120 MG/ML auto-injector pen  Generic drug: galcanezumab-gnlm     EPINEPHrine 0.3 MG/0.3ML solution auto-injector injection  Commonly known as: EPIPEN     estradiol 1 MG tablet  Commonly known as: ESTRACE     fluconazole 150 MG tablet  Commonly known as: DIFLUCAN     lisinopril 10 MG tablet  Commonly known as: PRINIVIL,ZESTRIL     meloxicam 15 MG tablet  Commonly known as: MOBIC  Take 1 tablet by mouth Daily.     metaxalone 800 MG tablet  Commonly known as: SKELAXIN  Take 1 tablet by mouth 3 (Three) Times a Day.     multivitamin tablet tablet  Commonly known as: THERAGRAN      nystatin 951579 UNIT/GM topical powder  Generic drug: nystatin     nystatin-triamcinolone 215161-9.1 UNIT/GM-% cream  Commonly known as: MYCOLOG II     omeprazole 40 MG capsule  Commonly known as: priLOSEC     ondansetron ODT 8 MG disintegrating tablet  Commonly known as: ZOFRAN-ODT     phentermine 37.5 MG capsule     prochlorperazine 10 MG tablet  Commonly known as: COMPAZINE     pseudoephedrine 30 MG tablet  Commonly known as: SUDAFED     rizatriptan 10 MG tablet  Commonly known as: MAXALT  Take 1 tablet by mouth 1 (One) Time As Needed for Migraine. May repeat in 2 hours if needed     simethicone 80 MG chewable tablet  Commonly known as: MYLICON     triamcinolone 0.147 MG/GM topical spray  Commonly known as: KENALOG     valACYclovir 500 MG tablet  Commonly known as: VALTREX                   Mango Kulkarni MD  04/13/25 0475

## 2025-04-16 ENCOUNTER — TRANSCRIBE ORDERS (OUTPATIENT)
Dept: ADMINISTRATIVE | Facility: HOSPITAL | Age: 60
End: 2025-04-16
Payer: COMMERCIAL

## 2025-04-16 DIAGNOSIS — R07.9 CHEST PAIN, UNSPECIFIED TYPE: Primary | ICD-10-CM

## 2025-04-22 ENCOUNTER — HOSPITAL ENCOUNTER (OUTPATIENT)
Dept: CARDIOLOGY | Facility: HOSPITAL | Age: 60
Discharge: HOME | End: 2025-04-22
Admitting: NURSE PRACTITIONER
Payer: COMMERCIAL

## 2025-04-22 VITALS
SYSTOLIC BLOOD PRESSURE: 160 MMHG | HEIGHT: 63 IN | BODY MASS INDEX: 24.8 KG/M2 | DIASTOLIC BLOOD PRESSURE: 81 MMHG | WEIGHT: 140 LBS | HEART RATE: 78 BPM

## 2025-04-22 DIAGNOSIS — R07.9 CHEST PAIN, UNSPECIFIED TYPE: ICD-10-CM

## 2025-04-22 LAB
AORTIC ARCH: 2.7 CM
AORTIC DIMENSIONLESS INDEX: 0.87 (DI)
ASCENDING AORTA: 3 CM
AV MEAN PRESS GRAD SYS DOP V1V2: 2.25 MMHG
AV VMAX SYS DOP: 89.4 CM/SEC
BH CV ECHO MEAS - ACS: 2.06 CM
BH CV ECHO MEAS - AO MAX PG: 3.2 MMHG
BH CV ECHO MEAS - AO ROOT DIAM: 3.4 CM
BH CV ECHO MEAS - AO V2 VTI: 20 CM
BH CV ECHO MEAS - AVA(I,D): 2.6 CM2
BH CV ECHO MEAS - EDV(CUBED): 55.4 ML
BH CV ECHO MEAS - EDV(MOD-SP2): 79 ML
BH CV ECHO MEAS - EDV(MOD-SP4): 81 ML
BH CV ECHO MEAS - EF(MOD-SP2): 60.8 %
BH CV ECHO MEAS - EF(MOD-SP4): 59.3 %
BH CV ECHO MEAS - ESV(CUBED): 17.3 ML
BH CV ECHO MEAS - ESV(MOD-SP2): 31 ML
BH CV ECHO MEAS - ESV(MOD-SP4): 33 ML
BH CV ECHO MEAS - FS: 32.1 %
BH CV ECHO MEAS - IVS/LVPW: 0.89 CM
BH CV ECHO MEAS - IVSD: 0.97 CM
BH CV ECHO MEAS - LA DIMENSION: 2.9 CM
BH CV ECHO MEAS - LAT PEAK E' VEL: 13.4 CM/SEC
BH CV ECHO MEAS - LV DIASTOLIC VOL/BSA (35-75): 48.7 CM2
BH CV ECHO MEAS - LV MASS(C)D: 123.9 GRAMS
BH CV ECHO MEAS - LV MAX PG: 3.1 MMHG
BH CV ECHO MEAS - LV MEAN PG: 1.48 MMHG
BH CV ECHO MEAS - LV SYSTOLIC VOL/BSA (12-30): 19.9 CM2
BH CV ECHO MEAS - LV V1 MAX: 88.6 CM/SEC
BH CV ECHO MEAS - LV V1 VTI: 17.4 CM
BH CV ECHO MEAS - LVIDD: 3.8 CM
BH CV ECHO MEAS - LVIDS: 2.6 CM
BH CV ECHO MEAS - LVOT AREA: 3 CM2
BH CV ECHO MEAS - LVOT DIAM: 1.96 CM
BH CV ECHO MEAS - LVPWD: 1.1 CM
BH CV ECHO MEAS - MED PEAK E' VEL: 10.4 CM/SEC
BH CV ECHO MEAS - MV A DUR: 0.18 SEC
BH CV ECHO MEAS - MV A MAX VEL: 90.7 CM/SEC
BH CV ECHO MEAS - MV DEC SLOPE: 503.6 CM/SEC2
BH CV ECHO MEAS - MV DEC TIME: 192 SEC
BH CV ECHO MEAS - MV E MAX VEL: 75.5 CM/SEC
BH CV ECHO MEAS - MV E/A: 0.83
BH CV ECHO MEAS - MV MAX PG: 4.2 MMHG
BH CV ECHO MEAS - MV MEAN PG: 1.97 MMHG
BH CV ECHO MEAS - MV P1/2T: 65.3 MSEC
BH CV ECHO MEAS - MV V2 VTI: 24.5 CM
BH CV ECHO MEAS - MVA(P1/2T): 3.4 CM2
BH CV ECHO MEAS - MVA(VTI): 2.15 CM2
BH CV ECHO MEAS - PA ACC TIME: 0.17 SEC
BH CV ECHO MEAS - PA V2 MAX: 77 CM/SEC
BH CV ECHO MEAS - PULM A REVS DUR: 0.15 SEC
BH CV ECHO MEAS - PULM A REVS VEL: 36.9 CM/SEC
BH CV ECHO MEAS - PULM DIAS VEL: 38 CM/SEC
BH CV ECHO MEAS - PULM S/D: 1.61
BH CV ECHO MEAS - PULM SYS VEL: 61.1 CM/SEC
BH CV ECHO MEAS - QP/QS: 0.79
BH CV ECHO MEAS - RAP SYSTOLE: 3 MMHG
BH CV ECHO MEAS - RV MAX PG: 0.83 MMHG
BH CV ECHO MEAS - RV V1 MAX: 45.6 CM/SEC
BH CV ECHO MEAS - RV V1 VTI: 11.6 CM
BH CV ECHO MEAS - RVDD: 2.06 CM
BH CV ECHO MEAS - RVOT DIAM: 2.14 CM
BH CV ECHO MEAS - RVSP: 25.1 MMHG
BH CV ECHO MEAS - SUP REN AO DIAM: 1.8 CM
BH CV ECHO MEAS - SV(LVOT): 52.7 ML
BH CV ECHO MEAS - SV(MOD-SP2): 48 ML
BH CV ECHO MEAS - SV(MOD-SP4): 48 ML
BH CV ECHO MEAS - SV(RVOT): 41.8 ML
BH CV ECHO MEAS - SVI(LVOT): 31.7 ML/M2
BH CV ECHO MEAS - SVI(MOD-SP2): 28.9 ML/M2
BH CV ECHO MEAS - SVI(MOD-SP4): 28.9 ML/M2
BH CV ECHO MEAS - TAPSE (>1.6): 2.48 CM
BH CV ECHO MEAS - TR MAX PG: 22.1 MMHG
BH CV ECHO MEAS - TR MAX VEL: 234.9 CM/SEC
BH CV ECHO MEASUREMENTS AVERAGE E/E' RATIO: 6.34
BH CV STRESS BP STAGE 1: NORMAL
BH CV STRESS BP STAGE 2: NORMAL
BH CV STRESS BP STAGE 3: NORMAL
BH CV STRESS DURATION MIN STAGE 1: 3
BH CV STRESS DURATION MIN STAGE 2: 3
BH CV STRESS DURATION MIN STAGE 3: 1
BH CV STRESS DURATION SEC STAGE 1: 0
BH CV STRESS DURATION SEC STAGE 2: 0
BH CV STRESS DURATION SEC STAGE 3: 20
BH CV STRESS ECHO POST STRESS EJECTION FRACTION EF: 72 %
BH CV STRESS GRADE STAGE 1: 10
BH CV STRESS GRADE STAGE 2: 12
BH CV STRESS GRADE STAGE 3: 14
BH CV STRESS HR STAGE 1: 114
BH CV STRESS HR STAGE 2: 138
BH CV STRESS HR STAGE 3: 156
BH CV STRESS METS STAGE 1: 4.6
BH CV STRESS METS STAGE 2: 7.1
BH CV STRESS METS STAGE 3: 9.1
BH CV STRESS PROTOCOL 1: NORMAL
BH CV STRESS RECOVERY BP: NORMAL MMHG
BH CV STRESS RECOVERY HR: 98 BPM
BH CV STRESS SPEED STAGE 1: 1.7
BH CV STRESS SPEED STAGE 2: 2.5
BH CV STRESS SPEED STAGE 3: 3.4
BH CV STRESS STAGE 1: 1
BH CV STRESS STAGE 2: 2
BH CV STRESS STAGE 3: 3
BH CV XLRA - RV BASE: 2.5 CM
BH CV XLRA - RV LENGTH: 6.7 CM
BH CV XLRA - RV MID: 1.74 CM
BH CV XLRA - TDI S': 10.4 CM/SEC
LEFT ATRIUM VOLUME INDEX: 14.2 ML/M2
LV EF BIPLANE MOD: 60 %
MAXIMAL PREDICTED HEART RATE: 162 BPM
PERCENT MAX PREDICTED HR: 97.53 %
SINUS: 2.9 CM
STJ: 2.7 CM
STRESS BASELINE BP: NORMAL MMHG
STRESS BASELINE HR: 83 BPM
STRESS O2 SAT REST: 99 %
STRESS PERCENT HR: 115 %
STRESS POST ESTIMATED WORKLOAD: 9.1 METS
STRESS POST EXERCISE DUR MIN: 7 MIN
STRESS POST EXERCISE DUR SEC: 21 SEC
STRESS POST O2 SAT PEAK: 99 %
STRESS POST PEAK BP: NORMAL MMHG
STRESS POST PEAK HR: 158 BPM
STRESS TARGET HR: 138 BPM

## 2025-04-22 PROCEDURE — 93320 DOPPLER ECHO COMPLETE: CPT | Performed by: INTERNAL MEDICINE

## 2025-04-22 PROCEDURE — 93325 DOPPLER ECHO COLOR FLOW MAPG: CPT | Performed by: INTERNAL MEDICINE

## 2025-04-22 PROCEDURE — 93320 DOPPLER ECHO COMPLETE: CPT

## 2025-04-22 PROCEDURE — 93350 STRESS TTE ONLY: CPT | Performed by: INTERNAL MEDICINE

## 2025-04-22 PROCEDURE — 93352 ADMIN ECG CONTRAST AGENT: CPT | Performed by: INTERNAL MEDICINE

## 2025-04-22 PROCEDURE — 93325 DOPPLER ECHO COLOR FLOW MAPG: CPT

## 2025-04-22 PROCEDURE — 93018 CV STRESS TEST I&R ONLY: CPT | Performed by: INTERNAL MEDICINE

## 2025-04-22 PROCEDURE — 93350 STRESS TTE ONLY: CPT

## 2025-04-22 PROCEDURE — 93017 CV STRESS TEST TRACING ONLY: CPT

## 2025-04-22 PROCEDURE — 25510000001 PERFLUTREN 6.52 MG/ML SUSPENSION 2 ML VIAL: Performed by: NURSE PRACTITIONER

## 2025-04-22 RX ADMIN — SODIUM CHLORIDE 5 ML: 9 INJECTION INTRAMUSCULAR; INTRAVENOUS; SUBCUTANEOUS at 14:32

## 2025-05-21 ENCOUNTER — APPOINTMENT (OUTPATIENT)
Dept: WOMENS IMAGING | Facility: HOSPITAL | Age: 60
End: 2025-05-21
Payer: COMMERCIAL

## 2025-05-21 ENCOUNTER — HOSPITAL ENCOUNTER (OUTPATIENT)
Dept: PET IMAGING | Facility: HOSPITAL | Age: 60
Discharge: HOME | End: 2025-05-21
Admitting: NURSE PRACTITIONER
Payer: COMMERCIAL

## 2025-05-21 ENCOUNTER — TRANSCRIBE ORDERS (OUTPATIENT)
Dept: PET IMAGING | Facility: HOSPITAL | Age: 60
End: 2025-05-21
Payer: COMMERCIAL

## 2025-05-21 DIAGNOSIS — M81.0 HIGH RISK FOR FRACTURE DUE TO OSTEOPOROSIS BY DEXA SCAN: Primary | ICD-10-CM

## 2025-05-21 PROCEDURE — 77065 DX MAMMO INCL CAD UNI: CPT | Performed by: RADIOLOGY

## 2025-05-21 PROCEDURE — 77061 BREAST TOMOSYNTHESIS UNI: CPT | Performed by: RADIOLOGY

## 2025-05-21 PROCEDURE — 77080 DXA BONE DENSITY AXIAL: CPT

## 2025-05-21 PROCEDURE — G0279 TOMOSYNTHESIS, MAMMO: HCPCS | Performed by: RADIOLOGY

## 2025-05-27 ENCOUNTER — OFFICE (OUTPATIENT)
Dept: URBAN - METROPOLITAN AREA CLINIC 76 | Facility: CLINIC | Age: 60
End: 2025-05-27
Payer: COMMERCIAL

## 2025-05-27 VITALS
DIASTOLIC BLOOD PRESSURE: 70 MMHG | HEART RATE: 100 BPM | SYSTOLIC BLOOD PRESSURE: 110 MMHG | OXYGEN SATURATION: 99 % | WEIGHT: 144 LBS | HEIGHT: 63 IN

## 2025-05-27 DIAGNOSIS — K66.0 PERITONEAL ADHESIONS (POSTPROCEDURAL) (POSTINFECTION): ICD-10-CM

## 2025-05-27 DIAGNOSIS — Z93.2 ILEOSTOMY STATUS: ICD-10-CM

## 2025-05-27 PROCEDURE — 99213 OFFICE O/P EST LOW 20 MIN: CPT | Performed by: INTERNAL MEDICINE

## 2025-07-24 ENCOUNTER — TRANSCRIBE ORDERS (OUTPATIENT)
Dept: ADMINISTRATIVE | Facility: HOSPITAL | Age: 60
End: 2025-07-24
Payer: COMMERCIAL

## 2025-07-24 ENCOUNTER — LAB (OUTPATIENT)
Dept: LAB | Facility: HOSPITAL | Age: 60
End: 2025-07-24
Payer: COMMERCIAL

## 2025-07-24 DIAGNOSIS — M81.0 SENILE OSTEOPOROSIS: ICD-10-CM

## 2025-07-24 DIAGNOSIS — M81.0 SENILE OSTEOPOROSIS: Primary | ICD-10-CM

## 2025-07-24 LAB
25(OH)D3 SERPL-MCNC: 72.4 NG/ML (ref 30–100)
ALBUMIN SERPL-MCNC: 4.2 G/DL (ref 3.5–5.2)
ALBUMIN/GLOB SERPL: 1.3 G/DL
ALP SERPL-CCNC: 42 U/L (ref 39–117)
ALT SERPL W P-5'-P-CCNC: 20 U/L (ref 1–33)
ANION GAP SERPL CALCULATED.3IONS-SCNC: 15 MMOL/L (ref 5–15)
AST SERPL-CCNC: 29 U/L (ref 1–32)
BASOPHILS # BLD AUTO: 0.07 10*3/MM3 (ref 0–0.2)
BASOPHILS NFR BLD AUTO: 1 % (ref 0–1.5)
BILIRUB SERPL-MCNC: 0.4 MG/DL (ref 0–1.2)
BUN SERPL-MCNC: 15 MG/DL (ref 6–20)
BUN/CREAT SERPL: 17 (ref 7–25)
CALCIUM SPEC-SCNC: 9.7 MG/DL (ref 8.6–10.5)
CHLORIDE SERPL-SCNC: 101 MMOL/L (ref 98–107)
CHOLEST SERPL-MCNC: 199 MG/DL (ref 0–200)
CO2 SERPL-SCNC: 22 MMOL/L (ref 22–29)
CREAT SERPL-MCNC: 0.88 MG/DL (ref 0.57–1)
DEPRECATED RDW RBC AUTO: 44.3 FL (ref 37–54)
EGFRCR SERPLBLD CKD-EPI 2021: 76.3 ML/MIN/1.73
EOSINOPHIL # BLD AUTO: 0.12 10*3/MM3 (ref 0–0.4)
EOSINOPHIL NFR BLD AUTO: 1.7 % (ref 0.3–6.2)
ERYTHROCYTE [DISTWIDTH] IN BLOOD BY AUTOMATED COUNT: 12.8 % (ref 12.3–15.4)
FERRITIN SERPL-MCNC: 54.4 NG/ML (ref 13–150)
GLOBULIN UR ELPH-MCNC: 3.3 GM/DL
GLUCOSE SERPL-MCNC: 96 MG/DL (ref 65–99)
HCT VFR BLD AUTO: 38.7 % (ref 34–46.6)
HDLC SERPL-MCNC: 73 MG/DL (ref 40–60)
HGB BLD-MCNC: 13.3 G/DL (ref 12–15.9)
IMM GRANULOCYTES # BLD AUTO: 0.01 10*3/MM3 (ref 0–0.05)
IMM GRANULOCYTES NFR BLD AUTO: 0.1 % (ref 0–0.5)
IRON 24H UR-MRATE: 79 MCG/DL (ref 37–145)
IRON SATN MFR SERPL: 14 % (ref 20–50)
LDLC SERPL CALC-MCNC: 80 MG/DL (ref 0–100)
LDLC/HDLC SERPL: 0.95 {RATIO}
LYMPHOCYTES # BLD AUTO: 2.43 10*3/MM3 (ref 0.7–3.1)
LYMPHOCYTES NFR BLD AUTO: 34.2 % (ref 19.6–45.3)
MCH RBC QN AUTO: 33 PG (ref 26.6–33)
MCHC RBC AUTO-ENTMCNC: 34.4 G/DL (ref 31.5–35.7)
MCV RBC AUTO: 96 FL (ref 79–97)
MONOCYTES # BLD AUTO: 0.53 10*3/MM3 (ref 0.1–0.9)
MONOCYTES NFR BLD AUTO: 7.5 % (ref 5–12)
NEUTROPHILS NFR BLD AUTO: 3.95 10*3/MM3 (ref 1.7–7)
NEUTROPHILS NFR BLD AUTO: 55.5 % (ref 42.7–76)
NRBC BLD AUTO-RTO: 0 /100 WBC (ref 0–0.2)
PHOSPHATE SERPL-MCNC: 4 MG/DL (ref 2.5–4.5)
PLATELET # BLD AUTO: 310 10*3/MM3 (ref 140–450)
PMV BLD AUTO: 9.7 FL (ref 6–12)
POTASSIUM SERPL-SCNC: 3.7 MMOL/L (ref 3.5–5.2)
PROT SERPL-MCNC: 7.5 G/DL (ref 6–8.5)
RBC # BLD AUTO: 4.03 10*6/MM3 (ref 3.77–5.28)
SODIUM SERPL-SCNC: 138 MMOL/L (ref 136–145)
TIBC SERPL-MCNC: 560 MCG/DL (ref 298–536)
TRANSFERRIN SERPL-MCNC: 376 MG/DL (ref 200–360)
TRIGL SERPL-MCNC: 285 MG/DL (ref 0–150)
TSH SERPL DL<=0.05 MIU/L-ACNC: 2.1 UIU/ML (ref 0.27–4.2)
VLDLC SERPL-MCNC: 46 MG/DL (ref 5–40)
WBC NRBC COR # BLD AUTO: 7.11 10*3/MM3 (ref 3.4–10.8)

## 2025-07-24 PROCEDURE — 84466 ASSAY OF TRANSFERRIN: CPT

## 2025-07-24 PROCEDURE — 36415 COLL VENOUS BLD VENIPUNCTURE: CPT

## 2025-07-24 PROCEDURE — 84100 ASSAY OF PHOSPHORUS: CPT

## 2025-07-24 PROCEDURE — 80061 LIPID PANEL: CPT

## 2025-07-24 PROCEDURE — 82728 ASSAY OF FERRITIN: CPT

## 2025-07-24 PROCEDURE — 80050 GENERAL HEALTH PANEL: CPT

## 2025-07-24 PROCEDURE — 83540 ASSAY OF IRON: CPT

## 2025-07-24 PROCEDURE — 82306 VITAMIN D 25 HYDROXY: CPT

## (undated) DEVICE — Device: Brand: DEFENDO AIR/WATER/SUCTION AND BIOPSY VALVE

## (undated) DEVICE — FRCP BX RADJAW4 NDL 2.8 240CM LG OG BX40

## (undated) DEVICE — CANN NASL CO2 TRULINK W/O2 A/

## (undated) DEVICE — BITEBLOCK OMNI BLOC

## (undated) DEVICE — TUBING, SUCTION, 1/4" X 10', STRAIGHT: Brand: MEDLINE